# Patient Record
Sex: MALE | Race: WHITE | NOT HISPANIC OR LATINO | Employment: OTHER | URBAN - METROPOLITAN AREA
[De-identification: names, ages, dates, MRNs, and addresses within clinical notes are randomized per-mention and may not be internally consistent; named-entity substitution may affect disease eponyms.]

---

## 2017-03-14 ENCOUNTER — OFFICE VISIT (OUTPATIENT)
Dept: MEDICAL GROUP | Facility: MEDICAL CENTER | Age: 62
End: 2017-03-14
Payer: COMMERCIAL

## 2017-03-14 VITALS
RESPIRATION RATE: 16 BRPM | WEIGHT: 172 LBS | OXYGEN SATURATION: 96 % | DIASTOLIC BLOOD PRESSURE: 80 MMHG | TEMPERATURE: 97.2 F | HEART RATE: 77 BPM | BODY MASS INDEX: 21.39 KG/M2 | SYSTOLIC BLOOD PRESSURE: 128 MMHG | HEIGHT: 75 IN

## 2017-03-14 DIAGNOSIS — R73.01 ELEVATED FASTING BLOOD SUGAR: ICD-10-CM

## 2017-03-14 DIAGNOSIS — F41.9 ANXIETY: ICD-10-CM

## 2017-03-14 DIAGNOSIS — J30.1 SEASONAL ALLERGIC RHINITIS DUE TO POLLEN: ICD-10-CM

## 2017-03-14 DIAGNOSIS — Z23 NEED FOR TDAP VACCINATION: ICD-10-CM

## 2017-03-14 DIAGNOSIS — Z00.00 ANNUAL PHYSICAL EXAM: ICD-10-CM

## 2017-03-14 DIAGNOSIS — R94.31 QT PROLONGATION: ICD-10-CM

## 2017-03-14 PROCEDURE — 99396 PREV VISIT EST AGE 40-64: CPT | Mod: 25 | Performed by: FAMILY MEDICINE

## 2017-03-14 PROCEDURE — 90715 TDAP VACCINE 7 YRS/> IM: CPT | Performed by: FAMILY MEDICINE

## 2017-03-14 PROCEDURE — 90471 IMMUNIZATION ADMIN: CPT | Performed by: FAMILY MEDICINE

## 2017-03-14 RX ORDER — FLUTICASONE PROPIONATE 50 MCG
2 SPRAY, SUSPENSION (ML) NASAL DAILY
Qty: 16 G | Refills: 3 | Status: ON HOLD | OUTPATIENT
Start: 2017-03-14 | End: 2017-04-13

## 2017-03-14 RX ORDER — ALPRAZOLAM 0.5 MG/1
0.5 TABLET ORAL 2 TIMES DAILY PRN
Qty: 180 TAB | Refills: 1 | Status: SHIPPED
Start: 2017-03-14 | End: 2017-08-22 | Stop reason: SDUPTHER

## 2017-03-14 ASSESSMENT — PATIENT HEALTH QUESTIONNAIRE - PHQ9: CLINICAL INTERPRETATION OF PHQ2 SCORE: 0

## 2017-03-14 NOTE — ASSESSMENT & PLAN NOTE
Patient has long history of anxiety. He states that he was diagnosed with anxiety early in his life around the age of 17 but probably had it before then. For a long time he has been on Xanax 0.5 mg twice daily which has helped to control his anxiety well. He has been tried on Paxil, Zoloft, Wellbutrin, other medication that he cannot remember, all of which had the opposite effect of what they're intended to, they caused more anxiety.  Patient is requesting refill on Xanax.

## 2017-03-14 NOTE — PROGRESS NOTES
"Subjective:   Efraín Ayala is a 62 y.o. male here today for annual    Seasonal allergies  Patient is complaining of increased allergies recently. He still been using Zyrtec nightly, which usually helps.    Anxiety  Patient has long history of anxiety. He states that he was diagnosed with anxiety early in his life around the age of 17 but probably had it before then. For a long time he has been on Xanax 0.5 mg twice daily which has helped to control his anxiety well. He has been tried on Paxil, Zoloft, Wellbutrin, other medication that he cannot remember, all of which had the opposite effect of what they're intended to, they caused more anxiety.  Patient is requesting refill on Xanax.    Elevated fasting blood sugar  A1c was 5.6 on last labs one year ago.         Current medicines (including changes today)  Current Outpatient Prescriptions   Medication Sig Dispense Refill   • fluticasone (FLONASE) 50 MCG/ACT nasal spray Spray 2 Sprays in nose every day. 16 g 3   • alprazolam (XANAX) 0.5 MG Tab Take 1 Tab by mouth 2 times a day as needed. FOR ANXIETY 180 Tab 1     No current facility-administered medications for this visit.     He  has a past medical history of Seasonal allergies (3/11/2015) and Anxiety (3/11/2015).    ROS   No chest pain, no shortness of breath, no abdominal pain       Objective:     Blood pressure 128/80, pulse 77, temperature 36.2 °C (97.2 °F), resp. rate 16, height 1.905 m (6' 3\"), weight 78.019 kg (172 lb), SpO2 96 %. Body mass index is 21.5 kg/(m^2).   Physical Exam:  Constitutional: Alert, no distress.  Skin: Warm, dry, good turgor, no rashes in visible areas.  Eye: Equal, round and reactive, conjunctiva clear, lids normal.  ENMT: Lips without lesions, good dentition, oropharynx clear.  Neck: Trachea midline, no masses, no thyromegaly. No cervical or supraclavicular lymphadenopathy  Respiratory: Unlabored respiratory effort, lungs clear to auscultation, no wheezes, no " shant.  Cardiovascular: Normal S1, S2, no murmur, no edema.  Rectal: No external lesions, normal sphincter tone, prostate moderately enlarged with no nodularity.  Psych: Alert and oriented x3, normal affect and mood.        Assessment and Plan:   The following treatment plan was discussed    1. Annual physical exam  Check labs and call with results. Discussed healthy lifestyle.  - CBC WITH DIFFERENTIAL; Future  - COMP METABOLIC PANEL; Future  - HEMOGLOBIN A1C; Future  - LIPID PROFILE; Future  - TSH WITH REFLEX TO FT4; Future  - VITAMIN D,25 HYDROXY; Future  - PROSTATE SPECIFIC AG SCREENING; Future    2. Seasonal allergic rhinitis due to pollen  Advised patient to use Zyrtec nightly. Prescription for Flonase to use as needed.  - fluticasone (FLONASE) 50 MCG/ACT nasal spray; Spray 2 Sprays in nose every day.  Dispense: 16 g; Refill: 3    3. Anxiety  Stable. Refill alprazolam. Advised patient to avoid alcohol intake.  - alprazolam (XANAX) 0.5 MG Tab; Take 1 Tab by mouth 2 times a day as needed. FOR ANXIETY  Dispense: 180 Tab; Refill: 1    4. Elevated fasting blood sugar  Check labs and call with results.    5. QT prolongation  Asymptomatic. Continue to monitor.    6. Need for Tdap vaccination  - TDAP VACCINE =>6YO IM      Followup: Return in about 1 year (around 3/14/2018) for Welcome to [employer], Long.

## 2017-03-14 NOTE — MR AVS SNAPSHOT
"        Efraín Ayala   3/14/2017 10:20 AM   Office Visit   MRN: [id number]    Department:  [facility]   Dept Phone:  [phone]    Description:  Male : 1955   Provider:  Binu Montemayor M.D.           Reason for Visit     Annual Exam           Allergies as of 3/14/2017     Allergen Noted Reactions    Shellfish Allergy 2015         You were diagnosed with     Annual physical exam   [906240]       Seasonal allergic rhinitis due to pollen   [3672072]       Anxiety   [495886]       Elevated fasting blood sugar   [113903]       QT prolongation   [639407]       Need for Tdap vaccination   [242429]         Vital Signs     Blood Pressure Pulse Temperature Respirations Height Weight    128/80 mmHg 77 36.2 °C (97.2 °F) 16 1.905 m (6' 3\") 78.019 kg (172 lb)    Body Mass Index Oxygen Saturation Smoking Status             21.50 kg/m2 96% Former Smoker         Basic Information     Date Of Birth Sex Race Ethnicity Preferred Language    1955 Male White Non- English      Problem List              ICD-10-CM Priority Class Noted - Resolved    Seasonal allergies J30.2   3/11/2015 - Present    Anxiety F41.9   3/11/2015 - Present    Elevated fasting blood sugar R73.01   3/11/2015 - Present    QT prolongation R94.31   2015 - Present      Health Maintenance        Date Due Completion Dates    IMM DTaP/Tdap/Td Vaccine (1 - Tdap) 1972 ---    IMM INFLUENZA (1) 2016    COLONOSCOPY 2020            Current Immunizations     Influenza Vaccine Quad Inj (Pf) 2015    SHINGLES VACCINE 2016    Tdap Vaccine  Incomplete      Below and/or attached are the medications your provider expects you to take. Review all of your home medications and newly ordered medications with your provider and/or pharmacist. Follow medication instructions as directed by your provider and/or pharmacist. Please keep your medication list with you and share with your provider. Update the " information when medications are discontinued, doses are changed, or new medications (including over-the-counter products) are added; and carry medication information at all times in the event of emergency situations     Allergies:  SHELLFISH ALLERGY - (reactions not documented)               Medications  Valid as of: March 14, 2017 - 10:38 AM    Generic Name Brand Name Tablet Size Instructions for use    ALPRAZolam (Tab) XANAX 0.5 MG Take 1 Tab by mouth 2 times a day as needed. FOR ANXIETY        Fluticasone Propionate (Suspension) FLONASE 50 MCG/ACT Spray 2 Sprays in nose every day.        .                 Medicines prescribed today were sent to:     [facility] [address] [place] [address] [place], [place] - [address] AT [facility] OF [employer] [facility]. & [place]    [address] [place] [address]    Phone: [phone] Fax: [phone]    Open 24 Hours?: No      Medication refill instructions:       If your prescription bottle indicates you have medication refills left, it is not necessary to call your provider’s office. Please contact your pharmacy and they will refill your medication.    If your prescription bottle indicates you do not have any refills left, you may request refills at any time through one of the following ways: The online [employer] system (except Urgent Care), by calling your provider’s office, or by asking your pharmacy to contact your provider’s office with a refill request. Medication refills are processed only during regular business hours and may not be available until the next business day. Your provider may request additional information or to have a follow-up visit with you prior to refilling your medication.   *Please Note: Medication refills are assigned a new Rx number when refilled electronically. Your pharmacy may indicate that no refills were authorized even though a new prescription for the same medication is available at the pharmacy. Please request the medicine by name with the pharmacy  before contacting your provider for a refill.        Your To Do List     Future Labs/Procedures Complete By Expires    CBC WITH DIFFERENTIAL  As directed 3/15/2018    COMP METABOLIC PANEL  As directed 3/15/2018    HEMOGLOBIN A1C  As directed 3/15/2018    LIPID PROFILE  As directed 3/15/2018    PROSTATE SPECIFIC AG SCREENING  As directed 3/15/2018    TSH WITH REFLEX TO FT4  As directed 3/14/2018    VITAMIN D,25 HYDROXY  As directed 3/15/2018         MyChart Access Code: Activation code not generated  Current MyChart Status: Active

## 2017-03-14 NOTE — ASSESSMENT & PLAN NOTE
Patient is complaining of increased allergies recently. He still been using Zyrtec nightly, which usually helps.

## 2017-03-30 ENCOUNTER — OFFICE VISIT (OUTPATIENT)
Dept: URGENT CARE | Facility: PHYSICIAN GROUP | Age: 62
End: 2017-03-30
Payer: COMMERCIAL

## 2017-03-30 ENCOUNTER — PATIENT MESSAGE (OUTPATIENT)
Dept: MEDICAL GROUP | Facility: MEDICAL CENTER | Age: 62
End: 2017-03-30

## 2017-03-30 ENCOUNTER — HOSPITAL ENCOUNTER (EMERGENCY)
Facility: MEDICAL CENTER | Age: 62
End: 2017-03-30
Attending: EMERGENCY MEDICINE
Payer: COMMERCIAL

## 2017-03-30 ENCOUNTER — HOSPITAL ENCOUNTER (OUTPATIENT)
Dept: RADIOLOGY | Facility: MEDICAL CENTER | Age: 62
End: 2017-03-30
Attending: PHYSICIAN ASSISTANT
Payer: COMMERCIAL

## 2017-03-30 VITALS
DIASTOLIC BLOOD PRESSURE: 76 MMHG | OXYGEN SATURATION: 96 % | WEIGHT: 168 LBS | SYSTOLIC BLOOD PRESSURE: 132 MMHG | TEMPERATURE: 98.8 F | BODY MASS INDEX: 21 KG/M2 | HEART RATE: 77 BPM | RESPIRATION RATE: 14 BRPM

## 2017-03-30 VITALS
HEIGHT: 76 IN | WEIGHT: 175.27 LBS | SYSTOLIC BLOOD PRESSURE: 155 MMHG | BODY MASS INDEX: 21.34 KG/M2 | HEART RATE: 65 BPM | TEMPERATURE: 97.9 F | DIASTOLIC BLOOD PRESSURE: 93 MMHG | RESPIRATION RATE: 16 BRPM

## 2017-03-30 DIAGNOSIS — K40.90 RIGHT INGUINAL HERNIA: ICD-10-CM

## 2017-03-30 DIAGNOSIS — R10.31 RIGHT INGUINAL PAIN: ICD-10-CM

## 2017-03-30 DIAGNOSIS — K40.30 INCARCERATED RIGHT INGUINAL HERNIA: Primary | ICD-10-CM

## 2017-03-30 LAB
APPEARANCE UR: CLEAR
BILIRUB UR STRIP-MCNC: NORMAL MG/DL
COLOR UR AUTO: NORMAL
GLUCOSE UR STRIP.AUTO-MCNC: NORMAL MG/DL
KETONES UR STRIP.AUTO-MCNC: NORMAL MG/DL
LEUKOCYTE ESTERASE UR QL STRIP.AUTO: NORMAL
NITRITE UR QL STRIP.AUTO: NORMAL
PH UR STRIP.AUTO: 6 [PH] (ref 5–8)
PROT UR QL STRIP: NORMAL MG/DL
RBC UR QL AUTO: NORMAL
SP GR UR STRIP.AUTO: 1
UROBILINOGEN UR STRIP-MCNC: NORMAL MG/DL

## 2017-03-30 PROCEDURE — 76857 US EXAM PELVIC LIMITED: CPT

## 2017-03-30 PROCEDURE — 81002 URINALYSIS NONAUTO W/O SCOPE: CPT | Performed by: PHYSICIAN ASSISTANT

## 2017-03-30 PROCEDURE — 99215 OFFICE O/P EST HI 40 MIN: CPT | Performed by: PHYSICIAN ASSISTANT

## 2017-03-30 PROCEDURE — 99283 EMERGENCY DEPT VISIT LOW MDM: CPT

## 2017-03-30 RX ORDER — OXYCODONE HYDROCHLORIDE AND ACETAMINOPHEN 5; 325 MG/1; MG/1
1-2 TABLET ORAL EVERY 4 HOURS PRN
Qty: 15 TAB | Refills: 0 | Status: SHIPPED | OUTPATIENT
Start: 2017-03-30 | End: 2017-08-22

## 2017-03-30 RX ORDER — IBUPROFEN 200 MG
400 TABLET ORAL EVERY 6 HOURS PRN
COMMUNITY

## 2017-03-30 RX ORDER — HYDROCODONE BITARTRATE AND ACETAMINOPHEN 5; 325 MG/1; MG/1
1 TABLET ORAL
Status: ON HOLD | COMMUNITY
End: 2017-04-13

## 2017-03-30 ASSESSMENT — ENCOUNTER SYMPTOMS
NAUSEA: 1
CONSTIPATION: 0
VOMITING: 0
ABDOMINAL PAIN: 1
FEVER: 0
DIARRHEA: 0
CHILLS: 0

## 2017-03-30 ASSESSMENT — PAIN SCALES - GENERAL: PAINLEVEL_OUTOF10: 7

## 2017-03-30 NOTE — MR AVS SNAPSHOT
Efraín Ayala   3/30/2017 10:30 AM   Office Visit   MRN: [id number]    Department:  [facility]   Dept Phone:  [phone]    Description:  Male : 1955   Provider:  Jael Spence PA-C           Reason for Visit     Groin Pain R side groin pain x1 wk      Allergies as of 3/30/2017     Allergen Noted Reactions    Shellfish Allergy 2015         You were diagnosed with     Right inguinal pain   [309598]         Vital Signs     Blood Pressure Pulse Temperature Respirations Weight Oxygen Saturation    132/76 mmHg 77 37.1 °C (98.8 °F) 14 76.204 kg (168 lb) 96%    Smoking Status                   Former Smoker           Basic Information     Date Of Birth Sex Race Ethnicity Preferred Language    1955 Male White Non- English      Your appointments     Mar 30, 2017  2:30 PM   [facility] [place] [facility]    [address] [place] [address]  [facility] [address] [phone]              Problem List              ICD-10-CM Priority Class Noted - Resolved    Seasonal allergies J30.2   3/11/2015 - Present    Anxiety F41.9   3/11/2015 - Present    Elevated fasting blood sugar R73.01   3/11/2015 - Present    QT prolongation R94.31   2015 - Present      Health Maintenance        Date Due Completion Dates    IMM INFLUENZA (1) 2016    COLONOSCOPY 2020    IMM DTaP/Tdap/Td Vaccine (2 - Td) 3/14/2027 3/14/2017            Results     POCT Urinalysis      Component Value Standard Range & Units    POC Color light yellow Negative    POC Appearance clear Negative    POC Leukocyte Esterase neg Negative    POC Nitrites neg Negative    POC Urobiligen neg Negative (0.2) mg/dL    POC Protein neg Negative mg/dL    POC Urine PH 6.0 5.0 - 8.0    POC Blood neg Negative    POC Specific Gravity 1.005 <1.005 - >1.030    POC Ketones neg Negative mg/dL    POC Biliruben neg Negative mg/dL    POC Glucose neg Negative mg/dL                        Current  Immunizations     Influenza Vaccine Quad Inj (Pf) 9/21/2015    SHINGLES VACCINE 2/17/2016    Tdap Vaccine 3/14/2017 10:38 AM      Below and/or attached are the medications your provider expects you to take. Review all of your home medications and newly ordered medications with your provider and/or pharmacist. Follow medication instructions as directed by your provider and/or pharmacist. Please keep your medication list with you and share with your provider. Update the information when medications are discontinued, doses are changed, or new medications (including over-the-counter products) are added; and carry medication information at all times in the event of emergency situations     Allergies:  SHELLFISH ALLERGY - (reactions not documented)               Medications  Valid as of: March 30, 2017 - 12:05 PM    Generic Name Brand Name Tablet Size Instructions for use    ALPRAZolam (Tab) XANAX 0.5 MG Take 1 Tab by mouth 2 times a day as needed. FOR ANXIETY        Fluticasone Propionate (Suspension) FLONASE 50 MCG/ACT Spray 2 Sprays in nose every day.        .                 Medicines prescribed today were sent to:     [facility] [address] [place] [address] [facility] [address] AT [facility] [employer] [facility]. & [place]    [address] [place] [address]    Phone: [phone] Fax: [phone]    Open 24 Hours?: No      Medication refill instructions:       If your prescription bottle indicates you have medication refills left, it is not necessary to call your provider’s office. Please contact your pharmacy and they will refill your medication.    If your prescription bottle indicates you do not have any refills left, you may request refills at any time through one of the following ways: The online [employer] system (except Urgent Care), by calling your provider’s office, or by asking your pharmacy to contact your provider’s office with a refill request. Medication refills are processed only during regular business  hours and may not be available until the next business day. Your provider may request additional information or to have a follow-up visit with you prior to refilling your medication.   *Please Note: Medication refills are assigned a new Rx number when refilled electronically. Your pharmacy may indicate that no refills were authorized even though a new prescription for the same medication is available at the pharmacy. Please request the medicine by name with the pharmacy before contacting your provider for a refill.        Your To Do List     Future Labs/Procedures Complete By Expires    US-INGUINAL HERNIA  As directed 3/30/2018         [employer] Access Code: Activation code not generated  Current [employer] Status: Active

## 2017-03-30 NOTE — ED AVS SNAPSHOT
Home Care Instructions                                                                                                                Efraín Ayala   MRN: [id number]    Department:  [facility], Emergency Dept   Date of Visit:  3/30/2017            [facility], Emergency Dept    [address] [place] [address]    Phone:  [phone]      You were seen by     Jose Cross M.D.      Your Diagnosis Was     Right inguinal hernia     K40.90       Follow-up Information     1. Schedule an appointment as soon as possible for a visit with Kwadwo Brewer M.D..    Specialty:  Surgery    Contact information    [address] [facility]  [address]  [place] [address] [phone]          2. Follow up with [facility], Emergency Dept.    Specialty:  Emergency Medicine    Why:  If symptoms worsen    Contact information    [address] [place] [address] [phone]      Medication Information     Review all of your home medications and newly ordered medications with your primary doctor and/or pharmacist as soon as possible. Follow medication instructions as directed by your doctor and/or pharmacist.     Please keep your complete medication list with you and share with your physician. Update the information when medications are discontinued, doses are changed, or new medications (including over-the-counter products) are added; and carry medication information at all times in the event of emergency situations.               Medication List      START taking these medications        Instructions    Morning Afternoon Evening Bedtime    oxycodone-acetaminophen 5-325 MG Tabs   Commonly known as:  PERCOCET        Take 1-2 Tabs by mouth every four hours as needed.   Dose:  1-2 Tab                          ASK your doctor about these medications        Instructions    Morning Afternoon Evening Bedtime    alprazolam 0.5 MG Tabs   Commonly known  as:  XANAX        Take 1 Tab by mouth 2 times a day as needed. FOR ANXIETY   Dose:  0.5 mg                        fluticasone 50 MCG/ACT nasal spray   Commonly known as:  FLONASE        Spray 2 Sprays in nose every day.   Dose:  2 Spray                        hydrocodone-acetaminophen 5-325 MG Tabs per tablet   Commonly known as:  NORCO        Take 1 Tab by mouth Once.   Dose:  1 Tab                        ibuprofen 200 MG Tabs   Commonly known as:  MOTRIN        Take 200 mg by mouth every 6 hours as needed.   Dose:  200 mg                             Where to Get Your Medications      You can get these medications from any pharmacy     Bring a paper prescription for each of these medications    - oxycodone-acetaminophen 5-325 MG Tabs              Discharge Instructions       Inguinal Hernia, Adult  Muscles help keep everything in the body in its proper place. But if a weak spot in the muscles develops, something can poke through. That is called a hernia. When this happens in the lower part of the belly (abdomen), it is called an inguinal hernia. (It takes its name from a part of the body in this region called the inguinal canal.) A weak spot in the wall of muscles lets some fat or part of the small intestine bulge through. An inguinal hernia can develop at any age. Men get them more often than women.  CAUSES   In adults, an inguinal hernia develops over time.  · It can be triggered by:  ¨ Suddenly straining the muscles of the lower abdomen.  ¨ Lifting heavy objects.  ¨ Straining to have a bowel movement. Difficult bowel movements (constipation) can lead to this.  ¨ Constant coughing. This may be caused by smoking or lung disease.  ¨ Being overweight.  ¨ Being pregnant.  ¨ Working at a job that requires long periods of standing or heavy lifting.  ¨ Having had an inguinal hernia before.  One type can be an emergency situation. It is called a strangulated inguinal hernia. It develops if part of the small intestine  slips through the weak spot and cannot get back into the abdomen. The blood supply can be cut off. If that happens, part of the intestine may die. This situation requires emergency surgery.  SYMPTOMS   Often, a small inguinal hernia has no symptoms. It is found when a healthcare provider does a physical exam. Larger hernias usually have symptoms.   · In adults, symptoms may include:  ¨ A lump in the groin. This is easier to see when the person is standing. It might disappear when lying down.  ¨ In men, a lump in the scrotum.  ¨ Pain or burning in the groin. This occurs especially when lifting, straining or coughing.  ¨ A dull ache or feeling of pressure in the groin.  · Signs of a strangulated hernia can include:  ¨ A bulge in the groin that becomes very painful and tender to the touch.  ¨ A bulge that turns red or purple.  ¨ Fever, nausea and vomiting.  ¨ Inability to have a bowel movement or to pass gas.  DIAGNOSIS   To decide if you have an inguinal hernia, a healthcare provider will probably do a physical examination.  · This will include asking questions about any symptoms you have noticed.  · The healthcare provider might feel the groin area and ask you to cough. If an inguinal hernia is felt, the healthcare provider may try to slide it back into the abdomen.  · Usually no other tests are needed.  TREATMENT   Treatments can vary. The size of the hernia makes a difference. Options include:  · Watchful waiting. This is often suggested if the hernia is small and you have had no symptoms.  ¨ No medical procedure will be done unless symptoms develop.  ¨ You will need to watch closely for symptoms. If any occur, contact your healthcare provider right away.  · Surgery. This is used if the hernia is larger or you have symptoms.  ¨ Open surgery. This is usually an outpatient procedure (you will not stay overnight in a hospital). An cut (incision) is made through the skin in the groin. The hernia is put back inside the  "abdomen. The weak area in the muscles is then repaired by herniorrhaphy or hernioplasty. Herniorrhaphy: in this type of surgery, the weak muscles are sewn back together. Hernioplasty: a patch or mesh is used to close the weak area in the abdominal wall.  ¨ Laparoscopy. In this procedure, a surgeon makes small incisions. A thin tube with a tiny video camera (called a laparoscope) is put into the abdomen. The surgeon repairs the hernia with mesh by looking with the video camera and using two long instruments.  HOME CARE INSTRUCTIONS   · After surgery to repair an inguinal hernia:  ¨ You will need to take pain medicine prescribed by your healthcare provider. Follow all directions carefully.  ¨ You will need to take care of the wound from the incision.  ¨ Your activity will be restricted for awhile. This will probably include no heavy lifting for several weeks. You also should not do anything too active for a few weeks. When you can return to work will depend on the type of job that you have.  · During \"watchful waiting\" periods, you should:  ¨ Maintain a healthy weight.  ¨ Eat a diet high in fiber (fruits, vegetables and whole grains).  ¨ Drink plenty of fluids to avoid constipation. This means drinking enough water and other liquids to keep your urine clear or pale yellow.  ¨ Do not lift heavy objects.  ¨ Do not stand for long periods of time.  ¨ Quit smoking. This should keep you from developing a frequent cough.  SEEK MEDICAL CARE IF:   · A bulge develops in your groin area.  · You feel pain, a burning sensation or pressure in the groin. This might be worse if you are lifting or straining.  · You develop a fever of more than 100.5° F (38.1° C).  SEEK IMMEDIATE MEDICAL CARE IF:   · Pain in the groin increases suddenly.  · A bulge in the groin gets bigger suddenly and does not go down.  · For men, there is sudden pain in the scrotum. Or, the size of the scrotum increases.  · A bulge in the groin area becomes red or " purple and is painful to touch.  · You have nausea or vomiting that does not go away.  · You feel your heart beating much faster than normal.  · You cannot have a bowel movement or pass gas.  · You develop a fever of more than 102.0° F (38.9° C).     This information is not intended to replace advice given to you by your health care provider. Make sure you discuss any questions you have with your health care provider.     Document Released: 05/06/2010 Document Revised: 03/11/2013 Document Reviewed: 05/06/2010  [employer] Interactive Patient Education ©2016 [employer]            Patient Information     Patient Information    Following emergency treatment: all patient requiring follow-up care must return either to a private physician or a clinic if your condition worsens before you are able to obtain further medical attention, please return to the emergency room.     Billing Information    At [facility], we work to make the billing process streamlined for our patients.  Our Representatives are here to answer any questions you may have regarding your hospital bill.  If you have insurance coverage and have supplied your insurance information to us, we will submit a claim to your insurer on your behalf.  Should you have any questions regarding your bill, we can be reached online or by phone as follows:  Online: You are able pay your bills online or live chat with our representatives about any billing questions you may have. We are here to help Monday - Friday from 8:00am to 7:30pm and 9:00am - 12:00pm on Saturdays.  Please visit [email or web address]  for more information.   Phone:  [phone] or [phone]    Please note that your emergency physician, surgeon, pathologist, radiologist, anesthesiologist, and other specialists are not employed by [facility] and will therefore bill separately for their services.  Please contact them directly for any questions concerning their bills at  the numbers below:     Emergency Physician Services:  [phone]  [facility]:  [phone]  [facility]:  [phone]  [facility]:  [phone]    1. Your final bill may vary from the amount quoted upon discharge if all procedures are not complete at that time, or if your doctor has additional procedures of which we are not aware. You will receive an additional bill if you return to the Emergency Department at [facility] for suture removal regardless of the facility of which the sutures were placed.     2. Please arrange for settlement of this account at the emergency registration.    3. All self-pay accounts are due in full at the time of treatment.  If you are unable to meet this obligation then payment is expected within 4-5 days.     4. If you have had radiology studies (CT, X-ray, Ultrasound, MRI), you have received a preliminary result during your emergency department visit. Please contact the radiology department [phone] to receive a copy of your final result. Please discuss the Final result with your primary physician or with the follow up physician provided.     Crisis Hotline:  [place] Crisis Hotline:  4-755-CEERMNJ or 1-516.227.6355  [place] Crisis Hotline:    1-201.324.7086 or 550-533-6311         ED Discharge Follow Up Questions    1. In order to provide you with very good care, we would like to follow up with a phone call in the next few days.  May we have your permission to contact you?     YES /  NO    2. What is the best phone number to call you? (       )_____-__________    3. What is the best time to call you?      Morning  /  Afternoon  /  Evening                   Patient Signature:  ____________________________________________________________    Date:  ____________________________________________________________

## 2017-03-30 NOTE — ED AVS SNAPSHOT
Digifeye Access Code: Activation code not generated  Current Digifeye Status: Active    [email or web address]  A secure, online tool to manage your health information     [employer]’s Digifeye® is a secure, online tool that connects you to your personalized health information from the privacy of your home -- day or night - making it very easy for you to manage your healthcare. Once the activation process is completed, you can even access your medical information using the Digifeye kenny, which is available for free in the Apple Kenny store or Google Play store.     Digifeye provides the following levels of access (as shown below):   My Chart Features   [facility] Primary Care Doctor [facility]  Specialists [facility]  Urgent  Care Non-[facility]  Primary Care  Doctor   Email your healthcare team securely and privately 24/7 X X X X   Manage appointments: schedule your next appointment; view details of past/upcoming appointments X      Request prescription refills. X      View recent personal medical records, including lab and immunizations X X X X   View health record, including health history, allergies, medications X X X X   Read reports about your outpatient visits, procedures, consult and ER notes X X X X   See your discharge summary, which is a recap of your hospital and/or ER visit that includes your diagnosis, lab results, and care plan. X X       How to register for Digifeye:  1. Go to  [email or web address].  2. Click on the Sign Up Now box, which takes you to the New Member Sign Up page. You will need to provide the following information:  a. Enter your Digifeye Access Code exactly as it appears at the top of this page. (You will not need to use this code after you’ve completed the sign-up process. If you do not sign up before the expiration date, you must request a new code.)   b. Enter your date of birth.   c. Enter your home email address.   d. Click Submit, and follow the next screen’s instructions.  3. Create a Digifeye ID. This will  be your [employer] login ID and cannot be changed, so think of one that is secure and easy to remember.  4. Create a [employer] password. You can change your password at any time.  5. Enter your Password Reset Question and Answer. This can be used at a later time if you forget your password.   6. Enter your e-mail address. This allows you to receive e-mail notifications when new information is available in [employer].  7. Click Sign Up. You can now view your health information.    For assistance activating your [employer] account, call [phone]

## 2017-03-30 NOTE — ED AVS SNAPSHOT
3/30/2017          Efraín Ayala  [address] [place] [address]    Dear Efraín:    [facility] wants to ensure your discharge home is safe and you or your loved ones have had all your questions answered regarding your care after you leave the hospital.    You may receive a telephone call within two days of your discharge.  This call is to make certain you understand your discharge instructions as well as ensure we provided you with the best care possible during your stay with us.     The call will only last approximately 3-5 minutes and will be done by a nurse.    Once again, we want to ensure your discharge home is safe and that you have a clear understanding of any next steps in your care.  If you have any questions or concerns, please do not hesitate to contact us, we are here for you.  Thank you for choosing [facility] for your healthcare needs.    Sincerely,    Torsten Love    [facility]

## 2017-03-30 NOTE — PROGRESS NOTES
Subjective:      Efraín Ayala is a 62 y.o. male who presents with Groin Pain    PMH:  has a past medical history of Seasonal allergies (3/11/2015) and Anxiety (3/11/2015).  MEDS:   Current outpatient prescriptions:   •  alprazolam (XANAX) 0.5 MG Tab, Take 1 Tab by mouth 2 times a day as needed. FOR ANXIETY, Disp: 180 Tab, Rfl: 1  •  fluticasone (FLONASE) 50 MCG/ACT nasal spray, Spray 2 Sprays in nose every day., Disp: 16 g, Rfl: 3  ALLERGIES:   Allergies   Allergen Reactions   • Shellfish Allergy      SURGHX:   Past Surgical History   Procedure Laterality Date   • Tibia orif  1992     Left     SOCHX:  reports that he has quit smoking. His smoking use included Cigarettes. He has a 7.5 pack-year smoking history. He has never used smokeless tobacco. He reports that he drinks about 10.5 oz of alcohol per week. He reports that he does not use illicit drugs.  FH: family history is not on file. Reviewed with patient/family. Not pertinent to this complaint.            HPI Comments: Patient presents with:  Groin Pain: R side groin pain x1 wk after picking up heavy box. PT states pain has gotten progressively worse over the course of the past 24 hours.  PT states initially it was his right low back that hurt more than his groin, but in the past 24 hours, his back pain is not his main concern.  PT denies N/V/D, but states he has had a poor appetite today.  PT has had a BM today and is passing flatus.  PT denies hx of hernia in the past.  Pt denies testicular pain.         Groin Pain  This is a new problem. The current episode started in the past 7 days. The problem occurs constantly. The problem has been gradually worsening. Associated symptoms include abdominal pain and nausea. Pertinent negatives include no chills, fever or vomiting. The symptoms are aggravated by bending, exertion, standing, walking and twisting. Treatments tried: vicodin at 3am. The treatment provided mild relief.       Review of Systems    Constitutional: Negative for fever and chills.   Gastrointestinal: Positive for nausea and abdominal pain. Negative for vomiting, diarrhea and constipation.   Genitourinary:        Right inguinal pain, denies testicular pain.    All other systems reviewed and are negative.         Objective:     /76 mmHg  Pulse 77  Temp(Src) 37.1 °C (98.8 °F)  Resp 14  Wt 76.204 kg (168 lb)  SpO2 96%     Physical Exam   Constitutional: He is oriented to person, place, and time. He appears well-developed and well-nourished. No distress.   HENT:   Head: Normocephalic.   Mouth/Throat: Oropharynx is clear and moist.   Eyes: Conjunctivae and EOM are normal. Pupils are equal, round, and reactive to light.   Neck: Normal range of motion. Neck supple.   Cardiovascular: Normal rate, regular rhythm and normal heart sounds.    Pulmonary/Chest: Effort normal and breath sounds normal.   Abdominal: Soft. There is tenderness. There is no rigidity, no rebound and no guarding. A hernia is present. Hernia confirmed positive in the right inguinal area.       Genitourinary:   Declined testicular exam at this time.   Musculoskeletal: Normal range of motion.   Neurological: He is alert and oriented to person, place, and time.   Skin: Skin is warm and dry.   Psychiatric: He has a normal mood and affect.   Nursing note and vitals reviewed.         US:     HISTORY/REASON FOR EXAM:  Right inguinal pain with heavy lifting      TECHNIQUE/EXAM DESCRIPTION AND NUMBER OF VIEWS:  Sonographic images of the right groin.    COMPARISON: None    FINDINGS:  There is a small right inguinal hernia, nonreducible per the sonographer. The hernia contains bowel and fat.         Impression        Small nonreducible right direct inguinal hernia.         Reading Provider Reading Date     Antonieta Burden M.D. Mar 30, 2017         Signing Provider Signing Date Signing Time     Antonieta Burden M.D. Mar 30, 2017  3:17 PM     UA: neg dip     Assessment/Plan:     1.  Incarcerated right inguinal hernia     2. Right inguinal pain  US-INGUINAL HERNIA    POCT Urinalysis     Discussed dx with patient and due to the non reducible nature of the hernia in combination with the worsening pain and anorexia today, I feel the patient needs to be evaluated in the ER for possible surgical intervention today.  PT verbalized understanding of this conversation.  PT was instructed to remain NPO from now forward unless instructed otherwise by ER staff.      PT requires evaluation and treatment at a facility that can provide a higher level of care due to acuity of illness/complaint. PT will co to ER by POV.

## 2017-03-31 ENCOUNTER — PATIENT MESSAGE (OUTPATIENT)
Dept: MEDICAL GROUP | Facility: MEDICAL CENTER | Age: 62
End: 2017-03-31

## 2017-03-31 NOTE — ED NOTES
Iv started lfa, pt with 7/10 pain. States last meal 1300, last po fluid 1600, 1 beer. Aware of npo status-await erp

## 2017-03-31 NOTE — ED NOTES
Assisted with patient care. Discharge instructions provided.  Pt verbalized the understanding of discharge instructions to follow up with PCP and to return to ER if condition worsens.  Pt ambulated out of ER without difficulty.   Patient educated not to combine driving or alcohol with percocet.

## 2017-03-31 NOTE — TELEPHONE ENCOUNTER
From: Manuel Ayala  To: Binu Montemayor M.D.  Sent: 3/31/2017 9:34 AM PDT  Subject: Test Result Question    Labs were done at [facility] and Manuel Ott.   ----- Message -----  From: Binu Montemayor M.D.  Sent: 3/31/2017 7:20 AM PDT  To: Manuel Ayala  Subject: RE: Test Result Question    Did you have the labs done at [facility]?    Dr. Montemayor      ----- Message -----   From: MANUEL AYALA   Sent: 3/30/2017 5:34 PM PDT   To: Binu Montemayor M.D.  Subject: Test Result Question    Hi  We never got Vanna lab results. He is being admitted next door for subinguinal hernia repair.     Lissa Ayala

## 2017-03-31 NOTE — ED NOTES
Chief Complaint   Patient presents with   • Hernia     Seen at . US showed sm rt inguinal hernia containing bowel and fat.    Last BM this morning, pain 67/10.

## 2017-03-31 NOTE — DISCHARGE INSTRUCTIONS
Inguinal Hernia, Adult  Muscles help keep everything in the body in its proper place. But if a weak spot in the muscles develops, something can poke through. That is called a hernia. When this happens in the lower part of the belly (abdomen), it is called an inguinal hernia. (It takes its name from a part of the body in this region called the inguinal canal.) A weak spot in the wall of muscles lets some fat or part of the small intestine bulge through. An inguinal hernia can develop at any age. Men get them more often than women.  CAUSES   In adults, an inguinal hernia develops over time.  · It can be triggered by:  ¨ Suddenly straining the muscles of the lower abdomen.  ¨ Lifting heavy objects.  ¨ Straining to have a bowel movement. Difficult bowel movements (constipation) can lead to this.  ¨ Constant coughing. This may be caused by smoking or lung disease.  ¨ Being overweight.  ¨ Being pregnant.  ¨ Working at a job that requires long periods of standing or heavy lifting.  ¨ Having had an inguinal hernia before.  One type can be an emergency situation. It is called a strangulated inguinal hernia. It develops if part of the small intestine slips through the weak spot and cannot get back into the abdomen. The blood supply can be cut off. If that happens, part of the intestine may die. This situation requires emergency surgery.  SYMPTOMS   Often, a small inguinal hernia has no symptoms. It is found when a healthcare provider does a physical exam. Larger hernias usually have symptoms.   · In adults, symptoms may include:  ¨ A lump in the groin. This is easier to see when the person is standing. It might disappear when lying down.  ¨ In men, a lump in the scrotum.  ¨ Pain or burning in the groin. This occurs especially when lifting, straining or coughing.  ¨ A dull ache or feeling of pressure in the groin.  · Signs of a strangulated hernia can include:  ¨ A bulge in the groin that becomes very painful and tender to the  touch.  ¨ A bulge that turns red or purple.  ¨ Fever, nausea and vomiting.  ¨ Inability to have a bowel movement or to pass gas.  DIAGNOSIS   To decide if you have an inguinal hernia, a healthcare provider will probably do a physical examination.  · This will include asking questions about any symptoms you have noticed.  · The healthcare provider might feel the groin area and ask you to cough. If an inguinal hernia is felt, the healthcare provider may try to slide it back into the abdomen.  · Usually no other tests are needed.  TREATMENT   Treatments can vary. The size of the hernia makes a difference. Options include:  · Watchful waiting. This is often suggested if the hernia is small and you have had no symptoms.  ¨ No medical procedure will be done unless symptoms develop.  ¨ You will need to watch closely for symptoms. If any occur, contact your healthcare provider right away.  · Surgery. This is used if the hernia is larger or you have symptoms.  ¨ Open surgery. This is usually an outpatient procedure (you will not stay overnight in a hospital). An cut (incision) is made through the skin in the groin. The hernia is put back inside the abdomen. The weak area in the muscles is then repaired by herniorrhaphy or hernioplasty. Herniorrhaphy: in this type of surgery, the weak muscles are sewn back together. Hernioplasty: a patch or mesh is used to close the weak area in the abdominal wall.  ¨ Laparoscopy. In this procedure, a surgeon makes small incisions. A thin tube with a tiny video camera (called a laparoscope) is put into the abdomen. The surgeon repairs the hernia with mesh by looking with the video camera and using two long instruments.  HOME CARE INSTRUCTIONS   · After surgery to repair an inguinal hernia:  ¨ You will need to take pain medicine prescribed by your healthcare provider. Follow all directions carefully.  ¨ You will need to take care of the wound from the incision.  ¨ Your activity will be  "restricted for awhile. This will probably include no heavy lifting for several weeks. You also should not do anything too active for a few weeks. When you can return to work will depend on the type of job that you have.  · During \"watchful waiting\" periods, you should:  ¨ Maintain a healthy weight.  ¨ Eat a diet high in fiber (fruits, vegetables and whole grains).  ¨ Drink plenty of fluids to avoid constipation. This means drinking enough water and other liquids to keep your urine clear or pale yellow.  ¨ Do not lift heavy objects.  ¨ Do not stand for long periods of time.  ¨ Quit smoking. This should keep you from developing a frequent cough.  SEEK MEDICAL CARE IF:   · A bulge develops in your groin area.  · You feel pain, a burning sensation or pressure in the groin. This might be worse if you are lifting or straining.  · You develop a fever of more than 100.5° F (38.1° C).  SEEK IMMEDIATE MEDICAL CARE IF:   · Pain in the groin increases suddenly.  · A bulge in the groin gets bigger suddenly and does not go down.  · For men, there is sudden pain in the scrotum. Or, the size of the scrotum increases.  · A bulge in the groin area becomes red or purple and is painful to touch.  · You have nausea or vomiting that does not go away.  · You feel your heart beating much faster than normal.  · You cannot have a bowel movement or pass gas.  · You develop a fever of more than 102.0° F (38.9° C).     This information is not intended to replace advice given to you by your health care provider. Make sure you discuss any questions you have with your health care provider.     Document Released: 05/06/2010 Document Revised: 03/11/2013 Document Reviewed: 05/06/2010  [employer] Interactive Patient Education ©2016 [employer]    "

## 2017-03-31 NOTE — PATIENT INSTRUCTIONS
Inguinal Hernia, Adult  Muscles help keep everything in the body in its proper place. But if a weak spot in the muscles develops, something can poke through. That is called a hernia. When this happens in the lower part of the belly (abdomen), it is called an inguinal hernia. (It takes its name from a part of the body in this region called the inguinal canal.) A weak spot in the wall of muscles lets some fat or part of the small intestine bulge through. An inguinal hernia can develop at any age. Men get them more often than women.  CAUSES   In adults, an inguinal hernia develops over time.  · It can be triggered by:  ¨ Suddenly straining the muscles of the lower abdomen.  ¨ Lifting heavy objects.  ¨ Straining to have a bowel movement. Difficult bowel movements (constipation) can lead to this.  ¨ Constant coughing. This may be caused by smoking or lung disease.  ¨ Being overweight.  ¨ Being pregnant.  ¨ Working at a job that requires long periods of standing or heavy lifting.  ¨ Having had an inguinal hernia before.  One type can be an emergency situation. It is called a strangulated inguinal hernia. It develops if part of the small intestine slips through the weak spot and cannot get back into the abdomen. The blood supply can be cut off. If that happens, part of the intestine may die. This situation requires emergency surgery.  SYMPTOMS   Often, a small inguinal hernia has no symptoms. It is found when a healthcare provider does a physical exam. Larger hernias usually have symptoms.   · In adults, symptoms may include:  ¨ A lump in the groin. This is easier to see when the person is standing. It might disappear when lying down.  ¨ In men, a lump in the scrotum.  ¨ Pain or burning in the groin. This occurs especially when lifting, straining or coughing.  ¨ A dull ache or feeling of pressure in the groin.  · Signs of a strangulated hernia can include:  ¨ A bulge in the groin that becomes very painful and tender to the  touch.  ¨ A bulge that turns red or purple.  ¨ Fever, nausea and vomiting.  ¨ Inability to have a bowel movement or to pass gas.  DIAGNOSIS   To decide if you have an inguinal hernia, a healthcare provider will probably do a physical examination.  · This will include asking questions about any symptoms you have noticed.  · The healthcare provider might feel the groin area and ask you to cough. If an inguinal hernia is felt, the healthcare provider may try to slide it back into the abdomen.  · Usually no other tests are needed.  TREATMENT   Treatments can vary. The size of the hernia makes a difference. Options include:  · Watchful waiting. This is often suggested if the hernia is small and you have had no symptoms.  ¨ No medical procedure will be done unless symptoms develop.  ¨ You will need to watch closely for symptoms. If any occur, contact your healthcare provider right away.  · Surgery. This is used if the hernia is larger or you have symptoms.  ¨ Open surgery. This is usually an outpatient procedure (you will not stay overnight in a hospital). An cut (incision) is made through the skin in the groin. The hernia is put back inside the abdomen. The weak area in the muscles is then repaired by herniorrhaphy or hernioplasty. Herniorrhaphy: in this type of surgery, the weak muscles are sewn back together. Hernioplasty: a patch or mesh is used to close the weak area in the abdominal wall.  ¨ Laparoscopy. In this procedure, a surgeon makes small incisions. A thin tube with a tiny video camera (called a laparoscope) is put into the abdomen. The surgeon repairs the hernia with mesh by looking with the video camera and using two long instruments.  HOME CARE INSTRUCTIONS   · After surgery to repair an inguinal hernia:  ¨ You will need to take pain medicine prescribed by your healthcare provider. Follow all directions carefully.  ¨ You will need to take care of the wound from the incision.  ¨ Your activity will be  "restricted for awhile. This will probably include no heavy lifting for several weeks. You also should not do anything too active for a few weeks. When you can return to work will depend on the type of job that you have.  · During \"watchful waiting\" periods, you should:  ¨ Maintain a healthy weight.  ¨ Eat a diet high in fiber (fruits, vegetables and whole grains).  ¨ Drink plenty of fluids to avoid constipation. This means drinking enough water and other liquids to keep your urine clear or pale yellow.  ¨ Do not lift heavy objects.  ¨ Do not stand for long periods of time.  ¨ Quit smoking. This should keep you from developing a frequent cough.  SEEK MEDICAL CARE IF:   · A bulge develops in your groin area.  · You feel pain, a burning sensation or pressure in the groin. This might be worse if you are lifting or straining.  · You develop a fever of more than 100.5° F (38.1° C).  SEEK IMMEDIATE MEDICAL CARE IF:   · Pain in the groin increases suddenly.  · A bulge in the groin gets bigger suddenly and does not go down.  · For men, there is sudden pain in the scrotum. Or, the size of the scrotum increases.  · A bulge in the groin area becomes red or purple and is painful to touch.  · You have nausea or vomiting that does not go away.  · You feel your heart beating much faster than normal.  · You cannot have a bowel movement or pass gas.  · You develop a fever of more than 102.0° F (38.9° C).     This information is not intended to replace advice given to you by your health care provider. Make sure you discuss any questions you have with your health care provider.     Document Released: 05/06/2010 Document Revised: 03/11/2013 Document Reviewed: 05/06/2010  [employer] Patient Education ©2016 [employer]    "

## 2017-04-10 ENCOUNTER — APPOINTMENT (OUTPATIENT)
Dept: ADMISSIONS | Facility: MEDICAL CENTER | Age: 62
End: 2017-04-10
Attending: SURGERY
Payer: COMMERCIAL

## 2017-04-13 ENCOUNTER — HOSPITAL ENCOUNTER (OUTPATIENT)
Facility: MEDICAL CENTER | Age: 62
End: 2017-04-13
Attending: SURGERY | Admitting: SURGERY
Payer: COMMERCIAL

## 2017-04-13 VITALS
HEART RATE: 77 BPM | HEIGHT: 76 IN | BODY MASS INDEX: 20.3 KG/M2 | TEMPERATURE: 99.3 F | OXYGEN SATURATION: 97 % | RESPIRATION RATE: 16 BRPM | WEIGHT: 166.67 LBS

## 2017-04-13 PROBLEM — K40.90 RIGHT INGUINAL HERNIA: Status: ACTIVE | Noted: 2017-04-13

## 2017-04-13 PROCEDURE — 160039 HCHG SURGERY MINUTES - EA ADDL 1 MIN LEVEL 3: Performed by: SURGERY

## 2017-04-13 PROCEDURE — 160025 RECOVERY II MINUTES (STATS): Performed by: SURGERY

## 2017-04-13 PROCEDURE — 502240 HCHG MISC OR SUPPLY RC 0272: Performed by: SURGERY

## 2017-04-13 PROCEDURE — 160002 HCHG RECOVERY MINUTES (STAT): Performed by: SURGERY

## 2017-04-13 PROCEDURE — 501838 HCHG SUTURE GENERAL: Performed by: SURGERY

## 2017-04-13 PROCEDURE — 160009 HCHG ANES TIME/MIN: Performed by: SURGERY

## 2017-04-13 PROCEDURE — 700102 HCHG RX REV CODE 250 W/ 637 OVERRIDE(OP)

## 2017-04-13 PROCEDURE — 160048 HCHG OR STATISTICAL LEVEL 1-5: Performed by: SURGERY

## 2017-04-13 PROCEDURE — 700111 HCHG RX REV CODE 636 W/ 250 OVERRIDE (IP)

## 2017-04-13 PROCEDURE — 110382 HCHG SHELL REV 271: Performed by: SURGERY

## 2017-04-13 PROCEDURE — 700101 HCHG RX REV CODE 250

## 2017-04-13 PROCEDURE — 500888 HCHG PACK, MINOR BASIN: Performed by: SURGERY

## 2017-04-13 PROCEDURE — 160036 HCHG PACU - EA ADDL 30 MINS PHASE I: Performed by: SURGERY

## 2017-04-13 PROCEDURE — 500380 HCHG DRAIN, PENROSE 1/4X12: Performed by: SURGERY

## 2017-04-13 PROCEDURE — 160035 HCHG PACU - 1ST 60 MINS PHASE I: Performed by: SURGERY

## 2017-04-13 PROCEDURE — A9270 NON-COVERED ITEM OR SERVICE: HCPCS

## 2017-04-13 PROCEDURE — 160028 HCHG SURGERY MINUTES - 1ST 30 MINS LEVEL 3: Performed by: SURGERY

## 2017-04-13 PROCEDURE — 160047 HCHG PACU  - EA ADDL 30 MINS PHASE II: Performed by: SURGERY

## 2017-04-13 PROCEDURE — C1781 MESH (IMPLANTABLE): HCPCS | Performed by: SURGERY

## 2017-04-13 PROCEDURE — 160046 HCHG PACU - 1ST 60 MINS PHASE II: Performed by: SURGERY

## 2017-04-13 PROCEDURE — A4606 OXYGEN PROBE USED W OXIMETER: HCPCS | Performed by: SURGERY

## 2017-04-13 PROCEDURE — A6402 STERILE GAUZE <= 16 SQ IN: HCPCS | Performed by: SURGERY

## 2017-04-13 PROCEDURE — 110371 HCHG SHELL REV 272: Performed by: SURGERY

## 2017-04-13 DEVICE — MESH PROGRIP RIGHT (1/EA): Type: IMPLANTABLE DEVICE | Status: FUNCTIONAL

## 2017-04-13 RX ORDER — OXYCODONE HCL 5 MG/5 ML
SOLUTION, ORAL ORAL
Status: COMPLETED
Start: 2017-04-13 | End: 2017-04-13

## 2017-04-13 RX ORDER — SODIUM CHLORIDE, SODIUM LACTATE, POTASSIUM CHLORIDE, CALCIUM CHLORIDE 600; 310; 30; 20 MG/100ML; MG/100ML; MG/100ML; MG/100ML
1000 INJECTION, SOLUTION INTRAVENOUS
Status: COMPLETED | OUTPATIENT
Start: 2017-04-13 | End: 2017-04-13

## 2017-04-13 RX ORDER — LIDOCAINE HYDROCHLORIDE 10 MG/ML
INJECTION, SOLUTION INFILTRATION; PERINEURAL
Status: COMPLETED
Start: 2017-04-13 | End: 2017-04-13

## 2017-04-13 RX ORDER — BUPIVACAINE HYDROCHLORIDE AND EPINEPHRINE 5; 5 MG/ML; UG/ML
INJECTION, SOLUTION EPIDURAL; INTRACAUDAL; PERINEURAL
Status: DISCONTINUED | OUTPATIENT
Start: 2017-04-13 | End: 2017-04-13 | Stop reason: HOSPADM

## 2017-04-13 RX ORDER — HYDROCODONE BITARTRATE AND ACETAMINOPHEN 5; 325 MG/1; MG/1
1 TABLET ORAL EVERY 6 HOURS PRN
Qty: 30 TAB | Refills: 0 | Status: SHIPPED | OUTPATIENT
Start: 2017-04-13 | End: 2017-08-22

## 2017-04-13 RX ADMIN — LIDOCAINE HYDROCHLORIDE: 10 INJECTION, SOLUTION INFILTRATION; PERINEURAL at 12:40

## 2017-04-13 RX ADMIN — SODIUM CHLORIDE, SODIUM LACTATE, POTASSIUM CHLORIDE, CALCIUM CHLORIDE 1000 ML: 600; 310; 30; 20 INJECTION, SOLUTION INTRAVENOUS at 12:40

## 2017-04-13 RX ADMIN — OXYCODONE HYDROCHLORIDE 10 MG: 5 SOLUTION ORAL at 14:35

## 2017-04-13 ASSESSMENT — PAIN SCALES - GENERAL
PAINLEVEL_OUTOF10: 2
PAINLEVEL_OUTOF10: 3
PAINLEVEL_OUTOF10: 3
PAINLEVEL_OUTOF10: ASSUMED PAIN PRESENT
PAINLEVEL_OUTOF10: 3
PAINLEVEL_OUTOF10: 8
PAINLEVEL_OUTOF10: 3

## 2017-04-13 NOTE — OP REPORT
Operative Report    Date: 4/13/2017    Surgeon: Kwadwo Brewer M.D.    Assistant: none    Anesthesia: Dr. Sal    Pre-operative Diagnosis: right inguinal hernia, K40.9 Unilateral inguinal hernia, without obstruction or gangrene    Post-operative Diagnosis:  right inguinal hernia    Procedure: repair of  right inguinal hernia with mesh    Findings: direct and small indirect right inguinal hernia    Procedure in detail: The patient was identified in the pre-operative holding area and brought to the operating room. Correct side and site were identified. Pre-operative antibiotics of Ancef were administered prior to the procedure. GETA was smoothly induced. The patient was prepped and draped in the usual sterile fashion.    After infiltration of local anesthetic, an inguinal incision was made down to the Anamaria's fascia. This layer was divided with the bovie and the external oblique fascia was identified. A small stab incision was made and then extended with the scissors to the external inguinal ring. The ilioinguinal nerve was identified but sacrificed. The cord was isolated with a penrose drain and the hernia sac was identified and dissected free of the cord to the internal inguinal ring. No sliding components of bladder or bowel were noted and the sac was suture ligated with 2-0 vicryl suture.     . A direct hernia component was identified, and the contents reduced.     A Progrip mesh was used to create a tension free repair. It was sutured to the pubic tubercle using  0-Ethibond sutures. It was placed on the shelving edge inferiorly and the conjoined tendon superiorly, and it was placed around the cord to recreate the internal ring  and the mesh placed under the external oblique laterally.     The wound was then copiously irrigated with normal saline. The ilioinguinal nerve was placed into the inguinal canal and the external oblique fascia was closed with interrupted 2-0 vicryl sutures. Anamaria's fascia was closed with  interrupted 2-0 vicryl and the skin was closed in a subcuticular fashion using running 4-0 monocryl suture. The wound was cleaned and dried.      The patient was awakened from anesthetic, and was taken to the recovery room in stable condition.    Sponge and needle counts were correct at the end of the case.     EBL: minimal    Dispo: to PACU    Kwadwo Brewer M.D.  [facility]  [phone]

## 2017-04-13 NOTE — IP AVS SNAPSHOT
" Home Care Instructions                                                                                                                Name:Efraín Ayala  Medical Record Number:[id number]  CSN: [id number]    YOB: 1955   Age: 62 y.o.  Sex: male  HT:1.93 m (6' 4\") WT: 75.6 kg (166 lb 10.7 oz)          Admit Date: 4/13/2017     Discharge Date:   Today's Date: 4/13/2017  Attending Doctor:  Kwadwo Brewer M.D.                  Allergies:  Other misc and Shellfish allergy                Discharge Instructions         ACTIVITY: Rest and take it easy for the first 24 hours.  A responsible adult is recommended to remain with you during that time.  It is normal to feel sleepy.  We encourage you to not do anything that requires balance, judgment or coordination.    MILD FLU-LIKE SYMPTOMS ARE NORMAL. YOU MAY EXPERIENCE GENERALIZED MUSCLE ACHES, THROAT IRRITATION, HEADACHE AND/OR SOME NAUSEA.    FOR 24 HOURS DO NOT:  Drive, operate machinery or run household appliances.  Drink beer or alcoholic beverages.   Make important decisions or sign legal documents.    SPECIAL INSTRUCTIONS:   Inguinal Hernia, Adult , Care After  Refer to this sheet in the next few weeks. These discharge instructions provide you with general information on caring for yourself after you leave the hospital. Your caregiver may also give you specific instructions. Your treatment has been planned according to the most current medical practices available, but unavoidable complications sometimes occur. If you have any problems or questions after discharge, please call your caregiver.  HOME CARE INSTRUCTIONS  · Put ice on the operative site.  ¨ Put ice in a plastic bag.  ¨ Place a towel between your skin and the bag.  ¨ Leave the ice on for 15-20 minutes at a time, 03-04 times a day while awake.  · Change bandages (dressings) as directed.  · Keep the wound dry and clean. The wound may be washed gently with soap and water. Gently blot or dab the " wound dry. It is okay to take showers 24 to 48 hours after surgery. Do not take baths, use swimming pools, or use hot tubs for 10 days, or as directed by your caregiver.  · Only take over-the-counter or prescription medicines for pain, discomfort, or fever as directed by your caregiver.  · Continue your normal diet as directed.  · Do not lift anything more than 10 pounds or play contact sports for 3 weeks, or as directed.  SEEK MEDICAL CARE IF:  · There is redness, swelling, or increasing pain in the wound.  · There is fluid (pus) coming from the wound.  · There is drainage from a wound lasting longer than 1 day.  · You have an oral temperature above 102° F (38.9° C).  · You notice a bad smell coming from the wound or dressing.  · The wound breaks open after the stitches (sutures) have been removed.  · You notice increasing pain in the shoulders (shoulder strap areas).  · You develop dizzy episodes or fainting while standing.  · You feel sick to your stomach (nauseous) or throw up (vomit).  SEEK IMMEDIATE MEDICAL CARE IF:  · You develop a rash.  · You have difficulty breathing.  · You develop a reaction or have side effects to medicines you were given.  MAKE SURE YOU:   · Understand these instructions.  · Will watch your condition.  · Will get help right away if you are not doing well or get worse.     This information is not intended to replace advice given to you by your health care provider. Make sure you discuss any questions you have with your health care provider.       DIET: To avoid nausea, slowly advance diet as tolerated, avoiding spicy or greasy foods for the first day.  Add more substantial food to your diet according to your physician's instructions. INCREASE FLUIDS AND FIBER TO AVOID CONSTIPATION.    SURGICAL DRESSING/BATHING: Follow instructions given to you by your surgeon    FOLLOW-UP APPOINTMENT:  A follow-up appointment should be arranged with your doctor; call to schedule.    You should CALL YOUR  PHYSICIAN if you develop:  Fever greater than 101 degrees F.  Pain not relieved by medication, or persistent nausea or vomiting.  Excessive bleeding (blood soaking through dressing) or unexpected drainage from the wound.  Extreme redness or swelling around the incision site, drainage of pus or foul smelling drainage.  Inability to urinate or empty your bladder within 8 hours.  Problems with breathing or chest pain.    You should call 911 if you develop problems with breathing or chest pain.  If you are unable to contact your doctor or surgical center, you should go to the nearest emergency room or urgent care center.  Physician's telephone #: Dr. Brewer [phone]    If any questions arise, call your doctor.  If your doctor is not available, please feel free to call the Surgical Center at [phone].  The Center is open Monday through Friday from 7AM to 7PM.  You can also call the [facility] HOTLINE open 24 hours/day, 7 days/week and speak to a nurse at [phone], or toll free at [phone].    A registered nurse may call you a few days after your surgery to see how you are doing after your procedure.    MEDICATIONS: Resume taking daily medication.  Take prescribed pain medication with food.  If no medication is prescribed, you may take non-aspirin pain medication if needed.  PAIN MEDICATION CAN BE VERY CONSTIPATING.  Take a stool softener or laxative such as senokot, pericolace, or milk of magnesia if needed.    Prescription given for **Norco*.  Last pain medication given at **2:35 pm.    If your physician has prescribed pain medication that includes Acetaminophen (Tylenol), do not take additional Acetaminophen (Tylenol) while taking the prescribed medication.    Depression / Suicide Risk    As you are discharged from this [facility] facility, it is important to learn how to keep safe from harming yourself.    Recognize the warning signs:  · Abrupt changes in personality, positive or negative-  including increase in energy   · Giving away possessions  · Change in eating patterns- significant weight changes-  positive or negative  · Change in sleeping patterns- unable to sleep or sleeping all the time   · Unwillingness or inability to communicate  · Depression  · Unusual sadness, discouragement and loneliness  · Talk of wanting to die  · Neglect of personal appearance   · Rebelliousness- reckless behavior  · Withdrawal from people/activities they love  · Confusion- inability to concentrate     If you or a loved one observes any of these behaviors or has concerns about self-harm, here's what you can do:  · Talk about it- your feelings and reasons for harming yourself  · Remove any means that you might use to hurt yourself (examples: pills, rope, extension cords, firearm)  · Get professional help from the community (Mental Health, Substance Abuse, psychological counseling)  · Do not be alone:Call your Safe Contact- someone whom you trust who will be there for you.  · Call your local CRISIS HOTLINE 523-1577 or 172-226-9567  · Call your local Children's Mobile Crisis Response Team [place] (675) 299-1883 or [employer]  · Call the toll free National Suicide Prevention Hotlines   · National Suicide Prevention Lifeline 544-433-CUWM (7337)  · National Hope Line Network 800-SUICIDE (559-1308)       Medication List      CHANGE how you take these medications        Instructions    Morning Afternoon Evening Bedtime    hydrocodone-acetaminophen 5-325 MG Tabs per tablet   What changed:  when to take this   Commonly known as:  NORCO        Take 1 Tab by mouth every 6 hours as needed.   Dose:  1 Tab                          CONTINUE taking these medications        Instructions    Morning Afternoon Evening Bedtime    alprazolam 0.5 MG Tabs   Commonly known as:  XANAX        Take 1 Tab by mouth 2 times a day as needed. FOR ANXIETY   Dose:  0.5 mg                        ibuprofen 200 MG Tabs   Commonly known  as:  MOTRIN        Take 400 mg by mouth every 6 hours as needed.   Dose:  400 mg                        oxycodone-acetaminophen 5-325 MG Tabs   Commonly known as:  PERCOCET        Take 1-2 Tabs by mouth every four hours as needed.   Dose:  1-2 Tab                             Where to Get Your Medications      You can get these medications from any pharmacy     Bring a paper prescription for each of these medications    - hydrocodone-acetaminophen 5-325 MG Tabs per tablet            Medication Information     Above and/or attached are the medications your physician expects you to take upon discharge. Review all of your home medications and newly ordered medications with your doctor and/or pharmacist. Follow medication instructions as directed by your doctor and/or pharmacist. Please keep your medication list with you and share with your physician. Update the information when medications are discontinued, doses are changed, or new medications (including over-the-counter products) are added; and carry medication information at all times in the event of emergency situations.        Resources     Quit Smoking / Tobacco Use:    I understand the use of any tobacco products increases my chance of suffering from future heart disease or stroke and could cause other illnesses which may shorten my life. Quitting the use of tobacco products is the single most important thing I can do to improve my health. For further information on smoking / tobacco cessation call a Toll Free Quit Line at 1-229.590.5668 (*[employer] [place]) or 1-351.623.1300 (American Lung Association) or you can access the web based program at www.lungusa.org.    [place] Tobacco Users Help Line:  (973) 139-9309       Toll Free: 1-897.331.4274  Quit Tobacco Program [facility] [phone]    Crisis Hotline:    [place] Crisis Hotline:  9-926-RIHEPLU or 1-480.937.4935    [place] Crisis Hotline:    1-170.105.2856 or  932.403.7285    Discharge Survey:   Thank you for choosing [facility]. We hope we did everything we could to make your hospital stay a pleasant one. You may be receiving a survey and we would appreciate your time and participation in answering the questions. Your input is very valuable to us in our efforts to improve our service to our patients and their families.            Signatures     My signature on this form indicates that:    1. I acknowledge receipt and understanding of these Home Care Instruction.  2. My questions regarding this information have been answered to my satisfaction.  3. I have formulated a plan with my discharge nurse to obtain my prescribed medications for home.    __________________________________      __________________________________                   Patient Signature                                 Guardian/Responsible Adult Signature      __________________________________                 __________       ________                       Nurse Signature                                               Date                 Time

## 2017-04-13 NOTE — OR NURSING
Pt to recovery, drowsy but otherwise appropriate. Medicated per MAR for tenderness in R groin/RLQ surgical site. Denies nausea, tolerating sips of clears. Dressing over incision CDI, cold pack applied. VSS on room air.

## 2017-04-13 NOTE — PROGRESS NOTES
The Medication Reconciliation process has been completed by interviewing the patient    Allergies have been reviewed  Antibiotic use in 30 days - NONE    Home Pharmacy:  [facility]

## 2017-04-13 NOTE — DISCHARGE SUMMARY
Hernia Repair Discharge Instructions:    1. ACTIVITIES: Upon discharge from the hospital, the day of surgery it is requested that you do no significant physical activity and limit mental activities, as you have had sedation. The day after surgery, you may resume activities of daily living, but for four weeks, it is recommended that you do no strenuous activities or heavy lifting (greater than 15 pounds).     2. DRIVING: You may drive whenever you are off pain medications and are able to perform the activities needed to drive, i.e. turning, bending, twisting, etc.     3. WOUND: It is not unusual for patients to experience swelling and even bruising at the hernia repair site. With inguinal hernias, sometimes the bruising and swelling may extend on to the penis or into the scrotum of male patients. This will resolve over the next few days.     4. ICE: please use ice on the wound to decrease the swelling for the first 24 hours and then discontinue.     5. BATHING: The dressing can be removed two days after surgery and the wound can then be wetted in a shower as normal, but avoid submersion in water (tub bath) for at least a week.    6. PAIN MEDICATION: You will be given a prescription for pain medication at discharge. Please take these as directed. It is important to remember not to take medications on an empty stomach as this may cause nausea.     7. BOWEL FUNCTION: After hernia repair, it is not uncommon for patients to experience constipation. This is due to decreasing activity levels as well as pain medications. You may wish to use a stool softener beginning immediately after surgery, and you may or may not need to use a laxative (Milk of Magnesia, Ex-lax; Senokot, etc.) as well.            8 .CALL IF YOU HAVE: (1) Fevers to more than 1010 F, (2) Unusual chest or leg pain, (3) Drainage or fluid from incision that may be foul smelling, increased tenderness or soreness at the wound or the wound edges are no longer  together,redness or swelling at the incision site. Please do not hesitate to call with any other questions.     9. APPOINTMENT: Contact our office at [phone] for a follow-up appointment in 1 to 2 weeks following your procedure.     If you have any additional questions, please do not hesitate to call the office and speak to either myself or the physician on call.     Office address:  [address] [place] [address], [place] [address]      Kwadwo Brewer M.D.  [facility]  [phone]

## 2017-04-13 NOTE — IP AVS SNAPSHOT
4/13/2017    Efraín Ayala  [address] [place] [address]    Dear Efraín:    [facility] wants to ensure your discharge home is safe and you or your loved ones have had all of your questions answered regarding your care after you leave the hospital.    Below is a list of resources and contact information should you have any questions regarding your hospital stay, follow-up instructions, or active medical symptoms.    Questions or Concerns Regarding… Contact   Medical Questions Related to Your Discharge  (7 days a week, 8am-5pm) Contact a Nurse Care Coordinator   [phone]   Medical Questions Not Related to Your Discharge  (24 hours a day / 7 days a week)  Contact the Nurse Health Line   [phone]    Medications or Discharge Instructions Refer to your discharge packet   or contact your [facility] Primary Care Provider   [phone]   Follow-up Appointment(s) Schedule your appointment via [employer]   or contact Scheduling [phone]   Billing Review your statement via [employer]  or contact Billing [phone]   Medical Records Review your records via [employer]   or contact Medical Records [phone]     You may receive a telephone call within two days of discharge. This call is to make certain you understand your discharge instructions and have the opportunity to have any questions answered. You can also easily access your medical information, test results and upcoming appointments via the [employer] free online health management tool. You can learn more and sign up at [email or web address]. For assistance setting up your [employer] account, please call [phone].    Once again, we want to ensure your discharge home is safe and that you have a clear understanding of any next steps in your care. If you have any questions or concerns, please do not hesitate to contact us, we are here for you. Thank you for choosing [facility] for your healthcare needs.    Sincerely,    Your [facility] Healthcare Team

## 2017-04-13 NOTE — DISCHARGE INSTRUCTIONS
ACTIVITY: Rest and take it easy for the first 24 hours.  A responsible adult is recommended to remain with you during that time.  It is normal to feel sleepy.  We encourage you to not do anything that requires balance, judgment or coordination.    MILD FLU-LIKE SYMPTOMS ARE NORMAL. YOU MAY EXPERIENCE GENERALIZED MUSCLE ACHES, THROAT IRRITATION, HEADACHE AND/OR SOME NAUSEA.    FOR 24 HOURS DO NOT:  Drive, operate machinery or run household appliances.  Drink beer or alcoholic beverages.   Make important decisions or sign legal documents.    SPECIAL INSTRUCTIONS:   Inguinal Hernia, Adult , Care After  Refer to this sheet in the next few weeks. These discharge instructions provide you with general information on caring for yourself after you leave the hospital. Your caregiver may also give you specific instructions. Your treatment has been planned according to the most current medical practices available, but unavoidable complications sometimes occur. If you have any problems or questions after discharge, please call your caregiver.  HOME CARE INSTRUCTIONS  · Put ice on the operative site.  ¨ Put ice in a plastic bag.  ¨ Place a towel between your skin and the bag.  ¨ Leave the ice on for 15-20 minutes at a time, 03-04 times a day while awake.  · Change bandages (dressings) as directed.  · Keep the wound dry and clean. The wound may be washed gently with soap and water. Gently blot or dab the wound dry. It is okay to take showers 24 to 48 hours after surgery. Do not take baths, use swimming pools, or use hot tubs for 10 days, or as directed by your caregiver.  · Only take over-the-counter or prescription medicines for pain, discomfort, or fever as directed by your caregiver.  · Continue your normal diet as directed.  · Do not lift anything more than 10 pounds or play contact sports for 3 weeks, or as directed.  SEEK MEDICAL CARE IF:  · There is redness, swelling, or increasing pain in the wound.  · There is fluid  (pus) coming from the wound.  · There is drainage from a wound lasting longer than 1 day.  · You have an oral temperature above 102° F (38.9° C).  · You notice a bad smell coming from the wound or dressing.  · The wound breaks open after the stitches (sutures) have been removed.  · You notice increasing pain in the shoulders (shoulder strap areas).  · You develop dizzy episodes or fainting while standing.  · You feel sick to your stomach (nauseous) or throw up (vomit).  SEEK IMMEDIATE MEDICAL CARE IF:  · You develop a rash.  · You have difficulty breathing.  · You develop a reaction or have side effects to medicines you were given.  MAKE SURE YOU:   · Understand these instructions.  · Will watch your condition.  · Will get help right away if you are not doing well or get worse.     This information is not intended to replace advice given to you by your health care provider. Make sure you discuss any questions you have with your health care provider.       DIET: To avoid nausea, slowly advance diet as tolerated, avoiding spicy or greasy foods for the first day.  Add more substantial food to your diet according to your physician's instructions. INCREASE FLUIDS AND FIBER TO AVOID CONSTIPATION.    SURGICAL DRESSING/BATHING: Follow instructions given to you by your surgeon    FOLLOW-UP APPOINTMENT:  A follow-up appointment should be arranged with your doctor; call to schedule.    You should CALL YOUR PHYSICIAN if you develop:  Fever greater than 101 degrees F.  Pain not relieved by medication, or persistent nausea or vomiting.  Excessive bleeding (blood soaking through dressing) or unexpected drainage from the wound.  Extreme redness or swelling around the incision site, drainage of pus or foul smelling drainage.  Inability to urinate or empty your bladder within 8 hours.  Problems with breathing or chest pain.    You should call 911 if you develop problems with breathing or chest pain.  If you are unable to contact your  doctor or surgical center, you should go to the nearest emergency room or urgent care center.  Physician's telephone #: Dr. Brewer [phone]    If any questions arise, call your doctor.  If your doctor is not available, please feel free to call the Surgical Center at [phone].  The Center is open Monday through Friday from 7AM to 7PM.  You can also call the HEALTH HOTLINE open 24 hours/day, 7 days/week and speak to a nurse at [phone], or toll free at [phone].    A registered nurse may call you a few days after your surgery to see how you are doing after your procedure.    MEDICATIONS: Resume taking daily medication.  Take prescribed pain medication with food.  If no medication is prescribed, you may take non-aspirin pain medication if needed.  PAIN MEDICATION CAN BE VERY CONSTIPATING.  Take a stool softener or laxative such as senokot, pericolace, or milk of magnesia if needed.    Prescription given for **Norco*.  Last pain medication given at **2:35 pm.    If your physician has prescribed pain medication that includes Acetaminophen (Tylenol), do not take additional Acetaminophen (Tylenol) while taking the prescribed medication.    Depression / Suicide Risk    As you are discharged from this [facility] facility, it is important to learn how to keep safe from harming yourself.    Recognize the warning signs:  · Abrupt changes in personality, positive or negative- including increase in energy   · Giving away possessions  · Change in eating patterns- significant weight changes-  positive or negative  · Change in sleeping patterns- unable to sleep or sleeping all the time   · Unwillingness or inability to communicate  · Depression  · Unusual sadness, discouragement and loneliness  · Talk of wanting to die  · Neglect of personal appearance   · Rebelliousness- reckless behavior  · Withdrawal from people/activities they love  · Confusion- inability to concentrate     If you or a loved one  observes any of these behaviors or has concerns about self-harm, here's what you can do:  · Talk about it- your feelings and reasons for harming yourself  · Remove any means that you might use to hurt yourself (examples: pills, rope, extension cords, firearm)  · Get professional help from the community (Mental Health, Substance Abuse, psychological counseling)  · Do not be alone:Call your Safe Contact- someone whom you trust who will be there for you.  · Call your local CRISIS HOTLINE 300-0635 or 695-726-6257  · Call your local Children's Mobile Crisis Response Team [place] (317) 234-8371 or www.[employer]  · Call the toll free National Suicide Prevention Hotlines   · National Suicide Prevention Lifeline 514-092-CHKD (1024)  · National Hope Line Network 800-SUICIDE (344-2440)

## 2017-04-19 NOTE — ED PROVIDER NOTES
ED Provider Note    CHIEF COMPLAINT  Chief Complaint   Patient presents with   • Hernia     Seen at . US showed sm rt inguinal hernia containing bowel and fat.        HPI  Efraín Ayala is a 62 y.o. male who presents to the emergency department for further evaluation of right inguinal hernia. Patient was seen at outpatient urgent care and was ultimately referred to the ER for ongoing inpatient care. Patient does complain of minimal to moderate discomfort in this area. Denies any other dermal changes. Denies any nausea or vomiting. Abdominal pain. No back pain. Passing gas and flatus. No urinary changes.    REVIEW OF SYSTEMS  See HPI for further details. All other systems are negative.     PAST MEDICAL HISTORY   has a past medical history of Seasonal allergies (3/11/2015); Anxiety (3/11/2015); Snoring; Psychiatric problem (2017); and Arthritis (2017).    SOCIAL HISTORY  Social History     Social History Main Topics   • Smoking status: Former Smoker -- 0.50 packs/day for 15 years     Types: Cigarettes   • Smokeless tobacco: Never Used      Comment: Quit 1986   • Alcohol Use: 10.5 oz/week     21 Cans of beer per week      Comment: 3-4 beers daily    • Drug Use: No      Comment: cannabis   • Sexual Activity:     Partners: Female       SURGICAL HISTORY   has past surgical history that includes tibia orif (1992) and inguinal hernia repair (Right, 4/13/2017).    CURRENT MEDICATIONS  Home Medications     Reviewed by Aide Lorenzo R.N. (Registered Nurse) on 03/30/17 at 1802  Med List Status: Complete    Medication Last Dose Status    alprazolam (XANAX) 0.5 MG Tab 3/30/2017 Active    fluticasone (FLONASE) 50 MCG/ACT nasal spray prn Active    hydrocodone-acetaminophen (NORCO) 5-325 MG Tab per tablet 3/29/2017 Active    ibuprofen (MOTRIN) 200 MG Tab 3/30/2017 Active                ALLERGIES  Allergies   Allergen Reactions   • Other Misc Hives     Per wife pt allergic to NITRILE golves, tolerates latex gloves   •  "Shellfish Allergy Hives       PHYSICAL EXAM  VITAL SIGNS: /93 mmHg  Pulse 65  Temp(Src) 36.6 °C (97.9 °F)  Resp 16  Ht 1.93 m (6' 4\")  Wt 79.5 kg (175 lb 4.3 oz)  BMI 21.34 kg/m2   Pulse ox interpretation:I interpret this pulse ox as normal.  Constitutional: Alert in no apparent distress.  HENT: No signs of trauma, Bilateral external ears normal, Nose normal.   Eyes: Pupils are equal and reactive, Conjunctiva normal, Non-icteric.   Neck: Normal range of motion, No tenderness, Supple, No stridor.   Lymphatic: No lymphadenopathy noted.   Cardiovascular: Regular rate and rhythm, no murmurs.   Thorax & Lungs: Normal breath sounds, No respiratory distress, No wheezing, No chest tenderness.   Abdomen: Bowel sounds normal, Soft, No tenderness, no peritoneal signs, minimally palpated right inguinal hernia.  Skin: Warm, Dry, No erythema, No rash.   Back: No bony tenderness, No CVA tenderness.   Extremities: Intact distal pulses, No edema, No tenderness, No cyanosis,  Negative Saroj's sign.   Musculoskeletal: Good range of motion in all major joints. No tenderness to palpation or major deformities noted.   Neurologic: Alert , Normal motor function, Normal sensory function, No focal deficits noted.   Psychiatric: Affect normal, Judgment normal, Mood normal.       DIAGNOSTIC STUDIES / PROCEDURES      LABS  Results for orders placed or performed in visit on 03/30/17   POCT Urinalysis   Result Value Ref Range    POC Color light yellow Negative    POC Appearance clear Negative    POC Leukocyte Esterase neg Negative    POC Nitrites neg Negative    POC Urobiligen neg Negative (0.2) mg/dL    POC Protein neg Negative mg/dL    POC Urine PH 6.0 5.0 - 8.0    POC Blood neg Negative    POC Specific Gravity 1.005 <1.005 - >1.030    POC Ketones neg Negative mg/dL    POC Biliruben neg Negative mg/dL    POC Glucose neg Negative mg/dL         RADIOLOGY  No orders to display           COURSE & MEDICAL DECISION MAKING  Pertinent Labs " & Imaging studies reviewed. (See chart for details)  Patient was admitted in transfer for further evaluation of right inguinal hernia. History of physical exams above. There is no evidence of strength or lesion or incarceration. I do believe the patient can continue with an outpatient course being followed by general surgery. Patient is presenting return precautions if needed.    The patient will not drink alcohol nor drive with prescribed medications. The patient will return for worsening symptoms and is stable at the time of discharge. The patient verbalizes understanding and will comply.    FINAL IMPRESSION  1. Right inguinal hernia            Electronically signed by: Jose Cross, 4/18/2017 9:03 PM

## 2017-06-14 ENCOUNTER — PATIENT MESSAGE (OUTPATIENT)
Dept: MEDICAL GROUP | Facility: MEDICAL CENTER | Age: 62
End: 2017-06-14

## 2017-07-14 ENCOUNTER — PATIENT MESSAGE (OUTPATIENT)
Dept: MEDICAL GROUP | Facility: MEDICAL CENTER | Age: 62
End: 2017-07-14

## 2017-07-14 DIAGNOSIS — Z12.83 SKIN CANCER SCREENING: ICD-10-CM

## 2017-07-14 NOTE — TELEPHONE ENCOUNTER
From: Efraín Ayala  To: Binu Montemayor M.D.  Sent: 7/14/2017 1:36 PM PDT  Subject: Non-Urgent Medical Question    I need a referral to a dermatologist for a black mole and some skin issues. Do I need to see you first?

## 2017-08-22 ENCOUNTER — OFFICE VISIT (OUTPATIENT)
Dept: MEDICAL GROUP | Facility: MEDICAL CENTER | Age: 62
End: 2017-08-22
Payer: COMMERCIAL

## 2017-08-22 VITALS
TEMPERATURE: 98.4 F | WEIGHT: 172 LBS | DIASTOLIC BLOOD PRESSURE: 82 MMHG | SYSTOLIC BLOOD PRESSURE: 122 MMHG | BODY MASS INDEX: 21.39 KG/M2 | OXYGEN SATURATION: 97 % | RESPIRATION RATE: 16 BRPM | HEART RATE: 85 BPM | HEIGHT: 75 IN

## 2017-08-22 DIAGNOSIS — F41.9 ANXIETY: ICD-10-CM

## 2017-08-22 DIAGNOSIS — G89.29 CHRONIC PAIN OF LEFT KNEE: ICD-10-CM

## 2017-08-22 DIAGNOSIS — M25.562 CHRONIC PAIN OF LEFT KNEE: ICD-10-CM

## 2017-08-22 PROCEDURE — 99214 OFFICE O/P EST MOD 30 MIN: CPT | Performed by: FAMILY MEDICINE

## 2017-08-22 RX ORDER — ALPRAZOLAM 0.5 MG/1
0.5 TABLET ORAL 2 TIMES DAILY PRN
Qty: 180 TAB | Refills: 1 | Status: SHIPPED
Start: 2017-08-22 | End: 2017-08-31 | Stop reason: SDUPTHER

## 2017-08-22 RX ORDER — TRAMADOL HYDROCHLORIDE 50 MG/1
50-100 TABLET ORAL EVERY 8 HOURS PRN
Qty: 60 TAB | Refills: 2 | Status: SHIPPED
Start: 2017-08-22 | End: 2017-09-11 | Stop reason: SDUPTHER
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
## 2017-08-22 NOTE — MR AVS SNAPSHOT
"        Efraín Ayala   2017 10:40 AM   Office Visit   MRN: [id number]    Department:  [facility]   Dept Phone:  [phone]    Description:  Male : 1955   Provider:  Binu Montemayor M.D.           Reason for Visit     Knee Pain left side      Allergies as of 2017     Allergen Noted Reactions    Other Misc 2017   Hives    Per wife pt allergic to NITRILE golves, tolerates latex gloves    Shellfish Allergy 2015   Hives      You were diagnosed with     Chronic pain of left knee   [522071]       Anxiety   [484144]         Vital Signs     Blood Pressure Pulse Temperature Respirations Height Weight    122/82 mmHg 85 36.9 °C (98.4 °F) 16 1.905 m (6' 3\") 78.019 kg (172 lb)    Body Mass Index Oxygen Saturation Smoking Status             21.50 kg/m2 97% Former Smoker         Basic Information     Date Of Birth Sex Race Ethnicity Preferred Language    1955 Male White Non- English      Problem List              ICD-10-CM Priority Class Noted - Resolved    Seasonal allergies J30.2   3/11/2015 - Present    Anxiety F41.9   3/11/2015 - Present    Elevated fasting blood sugar R73.01   3/11/2015 - Present    QT prolongation R94.31   2015 - Present    Right inguinal hernia K40.90   2017 - Present    Chronic pain of left knee M25.562, G89.29   2017 - Present      Health Maintenance        Date Due Completion Dates    IMM INFLUENZA (1) 2017    COLONOSCOPY 2020    IMM DTaP/Tdap/Td Vaccine (2 - Td) 3/14/2027 3/14/2017            Current Immunizations     Influenza Vaccine Quad Inj (Pf) 2015    SHINGLES VACCINE 2016    Tdap Vaccine 3/14/2017 10:38 AM      Below and/or attached are the medications your provider expects you to take. Review all of your home medications and newly ordered medications with your provider and/or pharmacist. Follow medication instructions as directed by your provider and/or pharmacist. Please keep your " medication list with you and share with your provider. Update the information when medications are discontinued, doses are changed, or new medications (including over-the-counter products) are added; and carry medication information at all times in the event of emergency situations     Allergies:  OTHER MISC - Hives     SHELLFISH ALLERGY - Hives               Medications  Valid as of: August 22, 2017 - 11:06 AM    Generic Name Brand Name Tablet Size Instructions for use    ALPRAZolam (Tab) XANAX 0.5 MG Take 1 Tab by mouth 2 times a day as needed for Anxiety.        Ibuprofen (Tab) MOTRIN 200 MG Take 400 mg by mouth every 6 hours as needed.        TraMADol HCl (Tab) ULTRAM 50 MG Take 1-2 Tabs by mouth every 8 hours as needed for Severe Pain.        .                 Medicines prescribed today were sent to:     [facility] [address] [place] [address] [place], [place] - [address] AT [facility]. & [address] [place]    [address] [facility] [address]    Phone: [phone] Fax: [phone]    Open 24 Hours?: No      Medication refill instructions:       If your prescription bottle indicates you have medication refills left, it is not necessary to call your provider’s office. Please contact your pharmacy and they will refill your medication.    If your prescription bottle indicates you do not have any refills left, you may request refills at any time through one of the following ways: The online [facility] system (except Urgent Care), by calling your provider’s office, or by asking your pharmacy to contact your provider’s office with a refill request. Medication refills are processed only during regular business hours and may not be available until the next business day. Your provider may request additional information or to have a follow-up visit with you prior to refilling your medication.   *Please Note: Medication refills are assigned a new Rx number when refilled electronically. Your pharmacy may indicate that no  refills were authorized even though a new prescription for the same medication is available at the pharmacy. Please request the medicine by name with the pharmacy before contacting your provider for a refill.           [employer] Access Code: Activation code not generated  Current Growisht Status: Active
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
## 2017-08-31 DIAGNOSIS — F41.9 ANXIETY: ICD-10-CM

## 2017-08-31 RX ORDER — ALPRAZOLAM 0.5 MG/1
TABLET ORAL
Qty: 180 TAB | Refills: 1 | Status: SHIPPED
Start: 2017-08-31 | End: 2018-03-26 | Stop reason: SDUPTHER

## 2017-09-11 ENCOUNTER — PATIENT MESSAGE (OUTPATIENT)
Dept: MEDICAL GROUP | Facility: MEDICAL CENTER | Age: 62
End: 2017-09-11

## 2017-09-11 DIAGNOSIS — G89.29 CHRONIC PAIN OF LEFT KNEE: ICD-10-CM

## 2017-09-11 DIAGNOSIS — M25.562 CHRONIC PAIN OF LEFT KNEE: ICD-10-CM

## 2017-09-11 RX ORDER — TRAMADOL HYDROCHLORIDE 50 MG/1
50-100 TABLET ORAL EVERY 8 HOURS PRN
Qty: 60 TAB | Refills: 2 | Status: SHIPPED
Start: 2017-09-11 | End: 2018-03-26

## 2017-09-11 NOTE — TELEPHONE ENCOUNTER
From: Efraín Ayala  To: Binu Montemayor M.D.  Sent: 9/11/2017 9:57 AM PDT  Subject: Prescription Question    Yohana did not get my pain RX.

## 2017-09-15 ENCOUNTER — NON-PROVIDER VISIT (OUTPATIENT)
Dept: MEDICAL GROUP | Facility: MEDICAL CENTER | Age: 62
End: 2017-09-15
Payer: COMMERCIAL

## 2017-09-15 VITALS — HEART RATE: 56 BPM | OXYGEN SATURATION: 98 %

## 2017-09-15 DIAGNOSIS — Z23 NEED FOR IMMUNIZATION AGAINST INFLUENZA: ICD-10-CM

## 2017-09-15 PROCEDURE — 90686 IIV4 VACC NO PRSV 0.5 ML IM: CPT | Performed by: FAMILY MEDICINE

## 2017-09-15 PROCEDURE — 90471 IMMUNIZATION ADMIN: CPT | Performed by: FAMILY MEDICINE

## 2017-09-15 NOTE — PROGRESS NOTES
"Efraín Ayala is a 62 y.o. male here for a non-provider visit for:   FLU    Reason for immunization: Annual Flu Vaccine  Immunization records indicate need for vaccine: Yes, confirmed with Epic  Minimum interval has been met for this vaccine: Yes  ABN completed: No    Order and dose verified by: LEMUEL MOORE Dated  08/07/2015 was given to patient: Yes  All IAC Questionnaire questions were answered \"No.\"    Patient tolerated injection and no adverse effects were observed or reported: Yes    Pt scheduled for next dose in series: No    "

## 2018-02-28 ENCOUNTER — PATIENT MESSAGE (OUTPATIENT)
Dept: MEDICAL GROUP | Facility: MEDICAL CENTER | Age: 63
End: 2018-02-28

## 2018-02-28 DIAGNOSIS — Z00.00 ANNUAL PHYSICAL EXAM: ICD-10-CM

## 2018-02-28 DIAGNOSIS — R73.01 ELEVATED FASTING BLOOD SUGAR: ICD-10-CM

## 2018-02-28 NOTE — TELEPHONE ENCOUNTER
1. Caller Name: Pt                                         Call Back Number: [phone] (home)         Patient approves a detailed voicemail message: N\A    2. SPECIFIC Action To Be Taken: Labs requested    3. Diagnosis/Clinical Reason for Request: Pt requesting labs to complete before his next Annual Exam    4. Specialty & Provider Name/Lab/Imaging Location: None specified    5. Is appointment scheduled for requested order/referral: no    Patient informed they will receive a return phone call from the office ONLY if there are any questions before processing their request. Advised to call back if they haven't received a call from the referral department in 5 days.

## 2018-02-28 NOTE — TELEPHONE ENCOUNTER
From: Efraín Ayala  To: Binu Montemayor M.D.  Sent: 2/28/2018 9:45 AM PST  Subject: Non-Urgent Medical Question    Hi, I have my annual with you scheduled for the end of March. I’d like to have my blood work done before the visit, please.  Thank you,  Efraín Ayala

## 2018-03-16 ENCOUNTER — HOSPITAL ENCOUNTER (OUTPATIENT)
Dept: LAB | Facility: MEDICAL CENTER | Age: 63
End: 2018-03-16
Attending: FAMILY MEDICINE
Payer: MEDICARE

## 2018-03-16 DIAGNOSIS — Z00.00 ANNUAL PHYSICAL EXAM: ICD-10-CM

## 2018-03-16 DIAGNOSIS — R73.01 ELEVATED FASTING BLOOD SUGAR: ICD-10-CM

## 2018-03-16 LAB
ALBUMIN SERPL BCP-MCNC: 4.8 G/DL (ref 3.2–4.9)
ALBUMIN/GLOB SERPL: 1.8 G/DL
ALP SERPL-CCNC: 35 U/L (ref 30–99)
ALT SERPL-CCNC: 21 U/L (ref 2–50)
ANION GAP SERPL CALC-SCNC: 7 MMOL/L (ref 0–11.9)
AST SERPL-CCNC: 20 U/L (ref 12–45)
BILIRUB SERPL-MCNC: 0.7 MG/DL (ref 0.1–1.5)
BUN SERPL-MCNC: 12 MG/DL (ref 8–22)
CALCIUM SERPL-MCNC: 9.6 MG/DL (ref 8.5–10.5)
CHLORIDE SERPL-SCNC: 103 MMOL/L (ref 96–112)
CHOLEST SERPL-MCNC: 205 MG/DL (ref 100–199)
CO2 SERPL-SCNC: 27 MMOL/L (ref 20–33)
CREAT SERPL-MCNC: 0.82 MG/DL (ref 0.5–1.4)
EST. AVERAGE GLUCOSE BLD GHB EST-MCNC: 120 MG/DL
GLOBULIN SER CALC-MCNC: 2.7 G/DL (ref 1.9–3.5)
GLUCOSE SERPL-MCNC: 77 MG/DL (ref 65–99)
HBA1C MFR BLD: 5.8 % (ref 0–5.6)
HDLC SERPL-MCNC: 73 MG/DL
LDLC SERPL CALC-MCNC: 121 MG/DL
POTASSIUM SERPL-SCNC: 4.3 MMOL/L (ref 3.6–5.5)
PROT SERPL-MCNC: 7.5 G/DL (ref 6–8.2)
SODIUM SERPL-SCNC: 137 MMOL/L (ref 135–145)
TRIGL SERPL-MCNC: 57 MG/DL (ref 0–149)
TSH SERPL DL<=0.005 MIU/L-ACNC: 2.48 UIU/ML (ref 0.38–5.33)

## 2018-03-16 PROCEDURE — 84443 ASSAY THYROID STIM HORMONE: CPT

## 2018-03-16 PROCEDURE — 36415 COLL VENOUS BLD VENIPUNCTURE: CPT

## 2018-03-16 PROCEDURE — 80061 LIPID PANEL: CPT

## 2018-03-16 PROCEDURE — 83036 HEMOGLOBIN GLYCOSYLATED A1C: CPT

## 2018-03-16 PROCEDURE — 80053 COMPREHEN METABOLIC PANEL: CPT

## 2018-03-26 ENCOUNTER — OFFICE VISIT (OUTPATIENT)
Dept: MEDICAL GROUP | Facility: MEDICAL CENTER | Age: 63
End: 2018-03-26
Payer: MEDICARE

## 2018-03-26 VITALS
RESPIRATION RATE: 12 BRPM | HEIGHT: 75 IN | OXYGEN SATURATION: 99 % | SYSTOLIC BLOOD PRESSURE: 118 MMHG | BODY MASS INDEX: 22.13 KG/M2 | TEMPERATURE: 97.6 F | DIASTOLIC BLOOD PRESSURE: 72 MMHG | WEIGHT: 178 LBS | HEART RATE: 92 BPM

## 2018-03-26 DIAGNOSIS — Z12.5 PROSTATE CANCER SCREENING: ICD-10-CM

## 2018-03-26 DIAGNOSIS — R73.03 PREDIABETES: ICD-10-CM

## 2018-03-26 DIAGNOSIS — Z00.00 WELCOME TO MEDICARE PREVENTIVE VISIT: ICD-10-CM

## 2018-03-26 DIAGNOSIS — G89.29 CHRONIC PAIN OF LEFT KNEE: ICD-10-CM

## 2018-03-26 DIAGNOSIS — Z23 NEED FOR VACCINATION: ICD-10-CM

## 2018-03-26 DIAGNOSIS — M25.562 CHRONIC PAIN OF LEFT KNEE: ICD-10-CM

## 2018-03-26 DIAGNOSIS — R94.31 QT PROLONGATION: ICD-10-CM

## 2018-03-26 DIAGNOSIS — J30.1 CHRONIC SEASONAL ALLERGIC RHINITIS DUE TO POLLEN: ICD-10-CM

## 2018-03-26 DIAGNOSIS — F41.9 ANXIETY: ICD-10-CM

## 2018-03-26 DIAGNOSIS — R73.01 ELEVATED FASTING BLOOD SUGAR: ICD-10-CM

## 2018-03-26 DIAGNOSIS — N40.0 BENIGN PROSTATIC HYPERPLASIA WITHOUT LOWER URINARY TRACT SYMPTOMS: ICD-10-CM

## 2018-03-26 DIAGNOSIS — E78.00 PURE HYPERCHOLESTEROLEMIA: ICD-10-CM

## 2018-03-26 PROBLEM — K40.90 RIGHT INGUINAL HERNIA: Status: RESOLVED | Noted: 2017-04-13 | Resolved: 2018-03-26

## 2018-03-26 PROCEDURE — G0009 ADMIN PNEUMOCOCCAL VACCINE: HCPCS | Performed by: FAMILY MEDICINE

## 2018-03-26 PROCEDURE — 90670 PCV13 VACCINE IM: CPT | Performed by: FAMILY MEDICINE

## 2018-03-26 PROCEDURE — G0402 INITIAL PREVENTIVE EXAM: HCPCS | Mod: 25 | Performed by: FAMILY MEDICINE

## 2018-03-26 PROCEDURE — G0403 EKG FOR INITIAL PREVENT EXAM: HCPCS | Performed by: FAMILY MEDICINE

## 2018-03-26 RX ORDER — ALPRAZOLAM 0.5 MG/1
0.5 TABLET ORAL 2 TIMES DAILY PRN
Qty: 180 TAB | Refills: 1 | Status: SHIPPED
Start: 2018-03-26 | End: 2018-06-24

## 2018-03-26 ASSESSMENT — ACTIVITIES OF DAILY LIVING (ADL): BATHING_REQUIRES_ASSISTANCE: 0

## 2018-03-26 ASSESSMENT — PATIENT HEALTH QUESTIONNAIRE - PHQ9: CLINICAL INTERPRETATION OF PHQ2 SCORE: 0

## 2018-03-26 NOTE — PROGRESS NOTES
HPI:  Efraín Ayala is a 63 y.o. here for Medicare Annual Wellness Visit     Patient Active Problem List    Diagnosis Date Noted   • Pure hypercholesterolemia 03/26/2018   • Prediabetes 03/26/2018   • Benign prostatic hyperplasia without lower urinary tract symptoms 03/26/2018   • Chronic pain of left knee 08/22/2017   • QT prolongation 04/16/2015   • Seasonal allergies 03/11/2015   • Anxiety 03/11/2015   • Elevated fasting blood sugar 03/11/2015       Current Outpatient Prescriptions   Medication Sig Dispense Refill   • ALPRAZolam (XANAX) 0.5 MG Tab Take 1 Tab by mouth 2 times a day as needed for Anxiety for up to 90 days. 180 Tab 1   • ibuprofen (MOTRIN) 200 MG Tab Take 400 mg by mouth every 6 hours as needed.       No current facility-administered medications for this visit.             Current supplements as per medication list.       Allergies: Other misc and Shellfish allergy    Current social contact/activities: .  1 child in [place]. 1 child in [place]. 1 child in [place].  Patient's perception of their health: good    He  reports that he has quit smoking. His smoking use included Cigarettes. He has a 7.50 pack-year smoking history. He has never used smokeless tobacco. He reports that he drinks about 8.4 oz of alcohol per week . He reports that he does not use drugs.  Counseling given: Not Answered        DPA/Advanced Directive:  Patient does not have an Advanced Directive.  A packet and workshop information was given on Advanced Directives.      ROS:    Gait: Uses no assistive device   Ostomy: no   Other tubes: no   Amputations: no   Chronic oxygen use: no   Last eye exam:  1 year ago  : Denies any urinary leakage during the last 6 months incontinence.         Depression Screening    Little interest or pleasure in doing things?  0 - not at all  Feeling down, depressed , or hopeless? 0 - not at all  Patient Health Questionnaire Score: 0     If depressive symptoms identified deferred to  follow up visit unless specifically addressed in assessment and plan.    Interpretation of PHQ-9 Total Score   Score Severity   1-4 No Depression   5-9 Mild Depression   10-14 Moderate Depression   15-19 Moderately Severe Depression   20-27 Severe Depression    Screening for Cognitive Impairment    Three Minute Recall (apple, watch, steven)  1/3    Draw clock face with all 12 numbers set to the hand to show 10 minutes past 11 o'clock  1    Cognitive concerns identified deferred for follow up unless specifically addressed in assessment and plan.    Fall Risk Assessment    Has the patient had two or more falls in the last year or any fall with injury in the last year?  No    Safety Assessment    Throw rugs on floor.  Yes  Handrails on all stairs.  No  Good lighting in all hallways.  Yes  Difficulty hearing.  Yes  Patient counseled about all safety risks that were identified.    Functional Assessment ADLs    Are there any barriers preventing you from cooking for yourself or meeting nutritional needs?  No.    Are there any barriers preventing you from driving safely or obtaining transportation?  No.    Are there any barriers preventing you from using a telephone or calling for help?  No.    Are there any barriers preventing you from shopping?  No.    Are there any barriers preventing you from taking care of your own finances?  No.    Are there any barriers preventing you from managing your medications?  No.    Are there any barriers preventing you from showering, bathing or dressing yourself?  No.    Are currently engaging any exercise or physical activity?  Yes.       Health Maintenance Summary                IMM PNEUMOCOCCAL 65+ (ADULT) LOW/MEDIUM RISK SERIES Overdue 1/6/2018     Annual Wellness Visit Overdue 1/6/2018     COLONOSCOPY Next Due 5/14/2020      Done 5/14/2015 AMB REFERRAL TO GI FOR COLONOSCOPY    IMM DTaP/Tdap/Td Vaccine Next Due 3/14/2027      Done 3/14/2017 Imm Admin: Tdap Vaccine          Patient Care  "Team:  Binu Montemayor M.D. as PCP - General (Family Medicine)        Social History   Substance Use Topics   • Smoking status: Former Smoker     Packs/day: 0.50     Years: 15.00     Types: Cigarettes   • Smokeless tobacco: Never Used      Comment: Quit 1986   • Alcohol use 8.4 oz/week     14 Cans of beer per week      Comment: 2 beers daily      History reviewed. No pertinent family history.  He  has a past medical history of Anxiety (3/11/2015); Arthritis (2017); Psychiatric problem (2017); Seasonal allergies (3/11/2015); and Snoring.   Past Surgical History:   Procedure Laterality Date   • INGUINAL HERNIA REPAIR Right 4/13/2017    Procedure: INGUINAL HERNIA REPAIR;  Surgeon: Kwadwo Brewer M.D.;  Location: SURGERY [facility];  Service:    • TIBIA ORIF  1992    Left       Exam:     Blood pressure 118/72, pulse 92, temperature 36.4 °C (97.6 °F), resp. rate 12, height 1.905 m (6' 3\"), weight 80.7 kg (178 lb), SpO2 99 %. Body mass index is 22.25 kg/m².    Constitutional: Alert, no distress.  Skin: Warm, dry, good turgor, no rashes in visible areas.  Eye: Equal, round and reactive, conjunctiva clear, lids normal.  Respiratory: Unlabored respiratory effort, lungs clear to auscultation, no wheezes, no ronchi.  Cardiovascular: Normal S1, S2, no murmur, no edema.  Psych: Alert and oriented x3, normal affect and mood.  Rectal: No external lesions, normal sphincter tone, moderately enlarged prostate with no nodularity.      EKG Interpretation-HR is 69 left atrial enlargement.        Assessment and Plan. The following treatment and monitoring plan is recommended:    1. Welcome to Medicare preventive visit  No concerns on EKG. No concerns with memory, per patient and patient's wife.  Advised patient to reduce alcohol intake.  - EKG  - Initial Preventive Exam ()    2. Prediabetes  Patient admits to eating a lot of sweets recently. Advised patient to adjust diet and follow-up with labs annually.  - Initial Preventive " Exam ()    3. Elevated fasting blood sugar  Advised diet and exercise.  - Initial Preventive Exam ()    4. Pure hypercholesterolemia  Advised patient to improve diet and despite regular exercise.  - Initial Preventive Exam ()    5. QT prolongation  Borderline. Continue to monitor with EKG every couple years.  - Initial Preventive Exam ()    6. Prostate cancer screening  Check PSA and message with results.  - PROSTATE SPECIFIC AG SCREENING; Future  - Initial Preventive Exam ()    7. Chronic pain of left knee  Mild. Discussed contraindication of tramadol and Xanax. Patient states he no longer needs tramadol.  - Initial Preventive Exam ()    8. Anxiety  Controlled. Patient has failed SSRIs, they caused increased anxiety. Continue Xanax 0.5 mg twice a day, which is working for him.  - ALPRAZolam (XANAX) 0.5 MG Tab; Take 1 Tab by mouth 2 times a day as needed for Anxiety for up to 90 days.  Dispense: 180 Tab; Refill: 1  - Initial Preventive Exam ()    9. Chronic seasonal allergic rhinitis due to pollen  Controlled with as needed Flonase.  - Initial Preventive Exam ()    10. Need for vaccination  Prevnar 13 given today.  - PNEUMOCOCCAL CONJUGATE VACCINE 13-VALENT  - Initial Preventive Exam ()    11. Benign prostatic hyperplasia without lower urinary tract symptoms  Asymptomatic. Advised patient continue monitoring for symptoms.  - Initial Preventive Exam ()        Services suggested: No services needed at this time  Health Care Screening: Age-appropriate preventive services recommended by USPTF and ACIP covered by Medicare were discussed today. Services ordered if indicated and agreed upon by the patient.  Referrals offered: Community-based lifestyle interventions to reduce health risks and promote self-management and wellness, fall prevention, nutrition, physical activity, tobacco-use cessation, weight loss, and mental health services as per orders if  indicated.    Discussion today about general wellness and lifestyle habits:    · Prevent falls and reduce trip hazards; Cautioned about securing or removing rugs.  · Have a working fire alarm and carbon monoxide detector;   · Engage in regular physical activity and social activities       Follow-up: Return in about 1 year (around 3/26/2019) for Annual.

## 2018-04-04 ENCOUNTER — HOSPITAL ENCOUNTER (OUTPATIENT)
Dept: LAB | Facility: MEDICAL CENTER | Age: 63
End: 2018-04-04
Attending: FAMILY MEDICINE
Payer: MEDICARE

## 2018-04-04 DIAGNOSIS — Z12.5 PROSTATE CANCER SCREENING: ICD-10-CM

## 2018-04-04 LAB — PSA SERPL-MCNC: 4.4 NG/ML (ref 0–4)

## 2018-04-04 PROCEDURE — 84153 ASSAY OF PSA TOTAL: CPT

## 2018-04-04 PROCEDURE — 36415 COLL VENOUS BLD VENIPUNCTURE: CPT

## 2018-04-05 ENCOUNTER — PATIENT MESSAGE (OUTPATIENT)
Dept: MEDICAL GROUP | Facility: MEDICAL CENTER | Age: 63
End: 2018-04-05

## 2018-04-05 DIAGNOSIS — R97.20 ELEVATED PSA: ICD-10-CM

## 2019-02-26 ENCOUNTER — HOSPITAL ENCOUNTER (OUTPATIENT)
Dept: LAB | Facility: MEDICAL CENTER | Age: 64
End: 2019-02-26
Attending: PHYSICIAN ASSISTANT
Payer: MEDICARE

## 2019-02-26 LAB — PSA SERPL-MCNC: 4.71 NG/ML (ref 0–4)

## 2019-02-26 PROCEDURE — 84153 ASSAY OF PSA TOTAL: CPT

## 2019-02-26 PROCEDURE — 36415 COLL VENOUS BLD VENIPUNCTURE: CPT

## 2019-02-28 DIAGNOSIS — F41.9 ANXIETY: ICD-10-CM

## 2019-02-28 RX ORDER — ALPRAZOLAM 0.5 MG/1
0.5 TABLET ORAL 2 TIMES DAILY PRN
Qty: 180 TAB | Refills: 1 | Status: SHIPPED
Start: 2019-02-28 | End: 2019-05-29

## 2019-03-25 ENCOUNTER — TELEPHONE (OUTPATIENT)
Dept: MEDICAL GROUP | Facility: MEDICAL CENTER | Age: 64
End: 2019-03-25

## 2019-03-25 NOTE — TELEPHONE ENCOUNTER
ESTABLISHED PATIENT PRE-VISIT PLANNING     Patient was NOT contacted to complete PVP.     Note: Patient will not be contacted if there is no indication to call.     1.  Reviewed notes from the last few office visits within the medical group: Yes    2.  If any orders were placed at last visit or intended to be done for this visit (i.e. 6 mos follow-up), do we have Results/Consult Notes?        •  Labs - Labs ordered, completed on 4/4/18 and results are in chart.   Note: If patient appointment is for lab review and patient did not complete labs, check with provider if OK to reschedule patient until labs completed.       •  Imaging - Imaging was not ordered at last office visit.       •  Referrals - No referrals were ordered at last office visit.    3. Is this appointment scheduled as a Hospital Follow-Up? No    4.  Immunizations were updated in [facility] using WebIZ?: Yes       •  Web Iz Recommendations: FLU, PNEUMOVAX (PPSV23), TD, VARICELLA (Chicken Pox)  and SHINGRIX (Shingles)    5.  Patient is due for the following Health Maintenance Topics:   Health Maintenance Due   Topic Date Due   • IMM ZOSTER VACCINES (2 of 3) 04/13/2016   • IMM INFLUENZA (1) 09/01/2018     6. Orders for overdue Health Maintenance topics pended in Pre-Charting? NO    7.  AHA (MDX) form printed for Provider? YES    8.  Patient was NOT informed to arrive 15 min prior to their scheduled appointment and bring in their medication bottles.

## 2019-03-26 ENCOUNTER — OFFICE VISIT (OUTPATIENT)
Dept: MEDICAL GROUP | Facility: MEDICAL CENTER | Age: 64
End: 2019-03-26
Payer: MEDICARE

## 2019-03-26 VITALS
OXYGEN SATURATION: 95 % | SYSTOLIC BLOOD PRESSURE: 140 MMHG | HEIGHT: 76 IN | DIASTOLIC BLOOD PRESSURE: 82 MMHG | TEMPERATURE: 98.1 F | HEART RATE: 89 BPM | WEIGHT: 177 LBS | BODY MASS INDEX: 21.55 KG/M2

## 2019-03-26 DIAGNOSIS — M25.562 CHRONIC PAIN OF LEFT KNEE: ICD-10-CM

## 2019-03-26 DIAGNOSIS — Z00.00 MEDICARE ANNUAL WELLNESS VISIT, SUBSEQUENT: ICD-10-CM

## 2019-03-26 DIAGNOSIS — R94.31 QT PROLONGATION: ICD-10-CM

## 2019-03-26 DIAGNOSIS — Z23 NEED FOR VACCINATION: ICD-10-CM

## 2019-03-26 DIAGNOSIS — E78.00 PURE HYPERCHOLESTEROLEMIA: ICD-10-CM

## 2019-03-26 DIAGNOSIS — N40.0 BENIGN PROSTATIC HYPERPLASIA WITHOUT LOWER URINARY TRACT SYMPTOMS: ICD-10-CM

## 2019-03-26 DIAGNOSIS — D70.8 OTHER NEUTROPENIA (HCC): ICD-10-CM

## 2019-03-26 DIAGNOSIS — R97.20 ELEVATED PSA: ICD-10-CM

## 2019-03-26 DIAGNOSIS — R73.03 PREDIABETES: ICD-10-CM

## 2019-03-26 DIAGNOSIS — F41.9 ANXIETY: ICD-10-CM

## 2019-03-26 DIAGNOSIS — R73.01 ELEVATED FASTING BLOOD SUGAR: ICD-10-CM

## 2019-03-26 DIAGNOSIS — G89.29 CHRONIC PAIN OF LEFT KNEE: ICD-10-CM

## 2019-03-26 DIAGNOSIS — J30.2 SEASONAL ALLERGIES: ICD-10-CM

## 2019-03-26 PROCEDURE — 8041 PR SCP AHA: Performed by: FAMILY MEDICINE

## 2019-03-26 PROCEDURE — G0009 ADMIN PNEUMOCOCCAL VACCINE: HCPCS | Performed by: FAMILY MEDICINE

## 2019-03-26 PROCEDURE — 90732 PPSV23 VACC 2 YRS+ SUBQ/IM: CPT | Performed by: FAMILY MEDICINE

## 2019-03-26 PROCEDURE — G0439 PPPS, SUBSEQ VISIT: HCPCS | Performed by: FAMILY MEDICINE

## 2019-03-26 ASSESSMENT — ENCOUNTER SYMPTOMS: GENERAL WELL-BEING: GOOD

## 2019-03-26 ASSESSMENT — ACTIVITIES OF DAILY LIVING (ADL): BATHING_REQUIRES_ASSISTANCE: 0

## 2019-03-26 ASSESSMENT — PATIENT HEALTH QUESTIONNAIRE - PHQ9: CLINICAL INTERPRETATION OF PHQ2 SCORE: 0

## 2019-04-09 ENCOUNTER — HOSPITAL ENCOUNTER (OUTPATIENT)
Dept: LAB | Facility: MEDICAL CENTER | Age: 64
End: 2019-04-09
Attending: FAMILY MEDICINE
Payer: MEDICARE

## 2019-04-09 DIAGNOSIS — R73.01 ELEVATED FASTING BLOOD SUGAR: ICD-10-CM

## 2019-04-09 DIAGNOSIS — D70.8 OTHER NEUTROPENIA (HCC): ICD-10-CM

## 2019-04-09 DIAGNOSIS — E78.00 PURE HYPERCHOLESTEROLEMIA: ICD-10-CM

## 2019-04-09 LAB
ALBUMIN SERPL BCP-MCNC: 4.6 G/DL (ref 3.2–4.9)
ALBUMIN/GLOB SERPL: 1.9 G/DL
ALP SERPL-CCNC: 35 U/L (ref 30–99)
ALT SERPL-CCNC: 18 U/L (ref 2–50)
ANION GAP SERPL CALC-SCNC: 7 MMOL/L (ref 0–11.9)
AST SERPL-CCNC: 20 U/L (ref 12–45)
BASOPHILS # BLD AUTO: 0.5 % (ref 0–1.8)
BASOPHILS # BLD: 0.02 K/UL (ref 0–0.12)
BILIRUB SERPL-MCNC: 0.7 MG/DL (ref 0.1–1.5)
BUN SERPL-MCNC: 14 MG/DL (ref 8–22)
CALCIUM SERPL-MCNC: 9.1 MG/DL (ref 8.5–10.5)
CHLORIDE SERPL-SCNC: 103 MMOL/L (ref 96–112)
CHOLEST SERPL-MCNC: 191 MG/DL (ref 100–199)
CO2 SERPL-SCNC: 26 MMOL/L (ref 20–33)
CREAT SERPL-MCNC: 0.78 MG/DL (ref 0.5–1.4)
EOSINOPHIL # BLD AUTO: 0.18 K/UL (ref 0–0.51)
EOSINOPHIL NFR BLD: 4.8 % (ref 0–6.9)
ERYTHROCYTE [DISTWIDTH] IN BLOOD BY AUTOMATED COUNT: 48.1 FL (ref 35.9–50)
EST. AVERAGE GLUCOSE BLD GHB EST-MCNC: 114 MG/DL
FASTING STATUS PATIENT QL REPORTED: NORMAL
GLOBULIN SER CALC-MCNC: 2.4 G/DL (ref 1.9–3.5)
GLUCOSE SERPL-MCNC: 98 MG/DL (ref 65–99)
HBA1C MFR BLD: 5.6 % (ref 0–5.6)
HCT VFR BLD AUTO: 45.7 % (ref 42–52)
HDLC SERPL-MCNC: 75 MG/DL
HGB BLD-MCNC: 15.2 G/DL (ref 14–18)
IMM GRANULOCYTES # BLD AUTO: 0.01 K/UL (ref 0–0.11)
IMM GRANULOCYTES NFR BLD AUTO: 0.3 % (ref 0–0.9)
LDLC SERPL CALC-MCNC: 107 MG/DL
LYMPHOCYTES # BLD AUTO: 1.11 K/UL (ref 1–4.8)
LYMPHOCYTES NFR BLD: 29.4 % (ref 22–41)
MCH RBC QN AUTO: 32.2 PG (ref 27–33)
MCHC RBC AUTO-ENTMCNC: 33.3 G/DL (ref 33.7–35.3)
MCV RBC AUTO: 96.8 FL (ref 81.4–97.8)
MONOCYTES # BLD AUTO: 0.55 K/UL (ref 0–0.85)
MONOCYTES NFR BLD AUTO: 14.6 % (ref 0–13.4)
NEUTROPHILS # BLD AUTO: 1.91 K/UL (ref 1.82–7.42)
NEUTROPHILS NFR BLD: 50.4 % (ref 44–72)
NRBC # BLD AUTO: 0 K/UL
NRBC BLD-RTO: 0 /100 WBC
PLATELET # BLD AUTO: 220 K/UL (ref 164–446)
PMV BLD AUTO: 10.2 FL (ref 9–12.9)
POTASSIUM SERPL-SCNC: 4.2 MMOL/L (ref 3.6–5.5)
PROT SERPL-MCNC: 7 G/DL (ref 6–8.2)
RBC # BLD AUTO: 4.72 M/UL (ref 4.7–6.1)
SODIUM SERPL-SCNC: 136 MMOL/L (ref 135–145)
TRIGL SERPL-MCNC: 46 MG/DL (ref 0–149)
TSH SERPL DL<=0.005 MIU/L-ACNC: 1.56 UIU/ML (ref 0.38–5.33)
WBC # BLD AUTO: 3.8 K/UL (ref 4.8–10.8)

## 2019-04-09 PROCEDURE — 83036 HEMOGLOBIN GLYCOSYLATED A1C: CPT

## 2019-04-09 PROCEDURE — 36415 COLL VENOUS BLD VENIPUNCTURE: CPT

## 2019-04-09 PROCEDURE — 80053 COMPREHEN METABOLIC PANEL: CPT

## 2019-04-09 PROCEDURE — 85025 COMPLETE CBC W/AUTO DIFF WBC: CPT

## 2019-04-09 PROCEDURE — 84443 ASSAY THYROID STIM HORMONE: CPT

## 2019-04-09 PROCEDURE — 80061 LIPID PANEL: CPT

## 2019-04-10 ENCOUNTER — TELEPHONE (OUTPATIENT)
Dept: MEDICAL GROUP | Facility: MEDICAL CENTER | Age: 64
End: 2019-04-10

## 2019-04-10 NOTE — TELEPHONE ENCOUNTER
Phone Number Called: [phone] (home)       Message: Patient notified of results.       Left Message for patient to call back: N\A

## 2019-04-10 NOTE — TELEPHONE ENCOUNTER
----- Message from Binu Montemayor M.D. sent at 4/10/2019  7:50 AM PDT -----  Please notify patient that average blood sugar is now normal.  Kidney function, liver function, thyroid function are also normal.  Cholesterol has improved since last year.  Blood counts are stable.  We should recheck labs annually.  Binu Montemayor M.D.

## 2019-04-30 ENCOUNTER — HOSPITAL ENCOUNTER (OUTPATIENT)
Dept: RADIOLOGY | Facility: MEDICAL CENTER | Age: 64
End: 2019-04-30
Attending: UROLOGY
Payer: MEDICARE

## 2019-04-30 DIAGNOSIS — R97.20 ELEVATED PSA: ICD-10-CM

## 2019-04-30 PROCEDURE — 700117 HCHG RX CONTRAST REV CODE 255: Performed by: UROLOGY

## 2019-04-30 PROCEDURE — 72197 MRI PELVIS W/O & W/DYE: CPT

## 2019-04-30 PROCEDURE — A9585 GADOBUTROL INJECTION: HCPCS | Performed by: UROLOGY

## 2019-04-30 PROCEDURE — 700111 HCHG RX REV CODE 636 W/ 250 OVERRIDE (IP): Performed by: RADIOLOGY

## 2019-04-30 RX ORDER — GADOBUTROL 604.72 MG/ML
10 INJECTION INTRAVENOUS ONCE
Status: COMPLETED | OUTPATIENT
Start: 2019-04-30 | End: 2019-04-30

## 2019-04-30 RX ADMIN — GLUCAGON HYDROCHLORIDE 1 MG: KIT at 13:10

## 2019-04-30 RX ADMIN — GADOBUTROL 10 ML: 604.72 INJECTION INTRAVENOUS at 14:13

## 2019-12-10 ENCOUNTER — PATIENT OUTREACH (OUTPATIENT)
Dept: HEALTH INFORMATION MANAGEMENT | Facility: OTHER | Age: 64
End: 2019-12-10

## 2019-12-10 NOTE — PROGRESS NOTES
Outcome: Left Message to call back so I am able to complete my SCP  introduction.    Please transfer to Patient Outreach Team at [phone] patient returns call.    [facility] Verified: yes    Attempt # 1

## 2020-01-07 NOTE — PROGRESS NOTES
Called member to introduce myself as his SCP  and wish him a happy birthday. His wife answered and took a message and informed me she got my letter. If the member calls back, please transfer to [phone].      Attempt #2

## 2020-02-17 DIAGNOSIS — F41.9 ANXIETY: ICD-10-CM

## 2020-02-18 RX ORDER — ALPRAZOLAM 0.5 MG/1
TABLET ORAL
Qty: 180 TAB | Refills: 0 | Status: SHIPPED
Start: 2020-02-18 | End: 2020-05-14 | Stop reason: SDUPTHER

## 2020-02-19 ENCOUNTER — TELEPHONE (OUTPATIENT)
Dept: MEDICAL GROUP | Facility: MEDICAL CENTER | Age: 65
End: 2020-02-19

## 2020-02-19 DIAGNOSIS — F41.9 ANXIETY: ICD-10-CM

## 2020-02-19 RX ORDER — ALPRAZOLAM 0.5 MG/1
0.5 TABLET ORAL 2 TIMES DAILY PRN
Qty: 180 TAB | Refills: 0 | Status: CANCELLED | OUTPATIENT
Start: 2020-02-19 | End: 2020-05-19

## 2020-03-30 NOTE — PROGRESS NOTES
Called member to speak with him about the SCP  program. His wife answered and said she got my letter and takes care of him fine on her own and they are not interested. She asked that he be put on the DNC list. I advised that I would.     Attempt # 3

## 2020-05-14 ENCOUNTER — OFFICE VISIT (OUTPATIENT)
Dept: MEDICAL GROUP | Facility: MEDICAL CENTER | Age: 65
End: 2020-05-14
Payer: MEDICARE

## 2020-05-14 VITALS
HEART RATE: 85 BPM | SYSTOLIC BLOOD PRESSURE: 122 MMHG | DIASTOLIC BLOOD PRESSURE: 68 MMHG | TEMPERATURE: 98.1 F | WEIGHT: 171 LBS | HEIGHT: 76 IN | OXYGEN SATURATION: 98 % | BODY MASS INDEX: 20.82 KG/M2

## 2020-05-14 DIAGNOSIS — Z23 NEED FOR VACCINATION: ICD-10-CM

## 2020-05-14 DIAGNOSIS — Z12.11 COLON CANCER SCREENING: ICD-10-CM

## 2020-05-14 DIAGNOSIS — F41.9 ANXIETY: ICD-10-CM

## 2020-05-14 DIAGNOSIS — H91.90 HEARING LOSS, UNSPECIFIED HEARING LOSS TYPE, UNSPECIFIED LATERALITY: ICD-10-CM

## 2020-05-14 DIAGNOSIS — R73.03 PREDIABETES: ICD-10-CM

## 2020-05-14 DIAGNOSIS — R97.20 ELEVATED PSA: ICD-10-CM

## 2020-05-14 DIAGNOSIS — Z00.00 MEDICARE ANNUAL WELLNESS VISIT, SUBSEQUENT: ICD-10-CM

## 2020-05-14 DIAGNOSIS — R73.01 ELEVATED FASTING BLOOD SUGAR: ICD-10-CM

## 2020-05-14 DIAGNOSIS — E78.00 PURE HYPERCHOLESTEROLEMIA: ICD-10-CM

## 2020-05-14 DIAGNOSIS — Z11.59 NEED FOR HEPATITIS C SCREENING TEST: ICD-10-CM

## 2020-05-14 PROCEDURE — 90471 IMMUNIZATION ADMIN: CPT | Performed by: FAMILY MEDICINE

## 2020-05-14 PROCEDURE — 90750 HZV VACC RECOMBINANT IM: CPT | Performed by: FAMILY MEDICINE

## 2020-05-14 PROCEDURE — 8041 PR SCP AHA: Performed by: FAMILY MEDICINE

## 2020-05-14 PROCEDURE — G0439 PPPS, SUBSEQ VISIT: HCPCS | Performed by: FAMILY MEDICINE

## 2020-05-14 RX ORDER — ALPRAZOLAM 0.5 MG/1
0.5 TABLET ORAL 2 TIMES DAILY PRN
Qty: 180 TAB | Refills: 1 | Status: SHIPPED | OUTPATIENT
Start: 2020-05-14 | End: 2020-08-10

## 2020-05-14 ASSESSMENT — FIBROSIS 4 INDEX: FIB4 SCORE: 1.44

## 2020-05-14 ASSESSMENT — ENCOUNTER SYMPTOMS: GENERAL WELL-BEING: GOOD

## 2020-05-14 ASSESSMENT — PATIENT HEALTH QUESTIONNAIRE - PHQ9: CLINICAL INTERPRETATION OF PHQ2 SCORE: 0

## 2020-05-14 ASSESSMENT — ACTIVITIES OF DAILY LIVING (ADL): BATHING_REQUIRES_ASSISTANCE: 0

## 2020-05-14 NOTE — PROGRESS NOTES
HPI:  Efraín Ayala is a 65 y.o. here for Medicare Annual Wellness Visit     Patient Active Problem List    Diagnosis Date Noted   • Elevated PSA 04/05/2018   • Pure hypercholesterolemia 03/26/2018   • Prediabetes 03/26/2018   • Benign prostatic hyperplasia without lower urinary tract symptoms 03/26/2018   • Chronic pain of left knee 08/22/2017   • QT prolongation 04/16/2015   • Seasonal allergies 03/11/2015   • Anxiety 03/11/2015   • Elevated fasting blood sugar 03/11/2015       Current Outpatient Medications   Medication Sig Dispense Refill   • ALPRAZolam (XANAX) 0.5 MG Tab TAKE 1 TABLET BY MOUTH TWICE DAILY AS NEEDED FOR ANXIETY 180 Tab 0   • ibuprofen (MOTRIN) 200 MG Tab Take 400 mg by mouth every 6 hours as needed.       No current facility-administered medications for this visit.             Current supplements as per medication list.       Allergies: Other misc and Shellfish allergy    Current social contact/activities: living with wife, 1 child lives in [place], 1 child lives in [place], 1 child lives in [place].     He  reports that he has quit smoking. His smoking use included cigarettes. He has a 7.50 pack-year smoking history. He has never used smokeless tobacco. He reports current alcohol use of about 8.4 oz of alcohol per week. He reports that he does not use drugs.  Counseling given: Not Answered  Comment: Quit 1986      DPA/Advanced Directive:  Patient has Advanced Directive on file.     ROS:    Gait: Uses no assistive device  Ostomy: No  Other tubes: No  Amputations: No  Chronic oxygen use: No  Last eye exam: about 2 years.  Wears hearing aids: No   : Denies any urinary leakage during the last 6 months      Depression Screening    Little interest or pleasure in doing things?  0 - not at all  Feeling down, depressed , or hopeless? 0 - not at all  Patient Health Questionnaire Score: 0     If depressive symptoms identified deferred to follow up visit unless specifically addressed  in assessment and plan.    Interpretation of PHQ-9 Total Score   Score Severity   1-4 No Depression   5-9 Mild Depression   10-14 Moderate Depression   15-19 Moderately Severe Depression   20-27 Severe Depression    Screening for Cognitive Impairment    Three Minute Recall (village, kitchen, baby) 3/3    Ashutosh clock face with all 12 numbers and set the hands to show 10 past 10.  Yes    Cognitive concerns identified deferred for follow up unless specifically addressed in assessment and plan.    Fall Risk Assessment    Has the patient had two or more falls in the last year or any fall with injury in the last year?  No    Safety Assessment    Throw rugs on floor.  No  Handrails on all stairs.  No  Good lighting in all hallways.  Yes  Difficulty hearing.  No  Patient counseled about all safety risks that were identified.    Functional Assessment ADLs    Are there any barriers preventing you from cooking for yourself or meeting nutritional needs?  No.    Are there any barriers preventing you from driving safely or obtaining transportation?  No.    Are there any barriers preventing you from using a telephone or calling for help?  No.    Are there any barriers preventing you from shopping?  No.    Are there any barriers preventing you from taking care of your own finances?  No.    Are there any barriers preventing you from managing your medications?  No.    Are there any barriers preventing you from showering, bathing or dressing yourself?  No.    Are you currently engaging in any exercise or physical activity?  Yes.     What is your perception of your health?  Good.      Health Maintenance Summary                HEPATITIS C SCREENING Overdue 1955     IMM ZOSTER VACCINES Overdue 4/13/2016      Done 2/17/2016 Imm Admin: Zoster Vaccine Live (ZVL) (Zostavax)    Annual Wellness Visit Overdue 3/26/2020      Done 3/26/2019 Visit Dx: Medicare annual wellness visit, subsequent     Patient has more history with this topic...     "COLONOSCOPY Overdue 5/14/2020      Done 5/14/2015 AMB REFERRAL TO GI FOR COLONOSCOPY    IMM INFLUENZA Next Due 9/1/2020      Done 9/15/2017 Imm Admin: Influenza Vaccine Quad Inj (Pf)     Patient has more history with this topic...    IMM DTaP/Tdap/Td Vaccine Next Due 3/14/2027      Done 3/14/2017 Imm Admin: Tdap Vaccine          Patient Care Team:  Binu Montemayor M.D. as PCP - General (Family Medicine)        Social History     Tobacco Use   • Smoking status: Former Smoker     Packs/day: 0.50     Years: 15.00     Pack years: 7.50     Types: Cigarettes   • Smokeless tobacco: Never Used   • Tobacco comment: Quit 1986   Substance Use Topics   • Alcohol use: Yes     Alcohol/week: 8.4 oz     Types: 14 Cans of beer per week     Comment: 2 beers daily    • Drug use: No     Comment: cannabis     History reviewed. No pertinent family history.  He  has a past medical history of Anxiety (3/11/2015), Arthritis (2017), Psychiatric problem (2017), Seasonal allergies (3/11/2015), and Snoring.   Past Surgical History:   Procedure Laterality Date   • INGUINAL HERNIA REPAIR Right 4/13/2017    Procedure: INGUINAL HERNIA REPAIR;  Surgeon: Kwadwo Brewer M.D.;  Location: SURGERY [facility];  Service:    • TIBIA ORIF  1992    Left       Exam:   /68   Pulse 85   Temp 36.7 °C (98.1 °F) (Temporal)   Ht 1.93 m (6' 4\")   Wt 77.6 kg (171 lb)   SpO2 98%  Body mass index is 20.81 kg/m².    Constitutional: Alert, no distress.  Skin: Warm, dry, good turgor, no rashes in visible areas.  Eye: Equal, round and reactive, conjunctiva clear, lids normal.  Ears: TM pearly gray bilaterally.  Respiratory: Unlabored respiratory effort, lungs clear to auscultation, no wheezes, no ronchi.  Cardiovascular: Normal S1, S2, no murmur, no edema.  Psych: Alert and oriented x3, normal affect and mood.      Assessment and Plan. The following treatment and monitoring plan is recommended:    1. Medicare annual wellness visit, subsequent  - Subsequent " Annual Wellness Visit - Includes PPPS ()    2. Elevated PSA  Negative MRI and prostate biopsy within the last 2 years.  Follow-up PSA and monitor trend.  He is known asymptomatic BPH, which could explain the slight elevation in PSA.  - PROSTATE SPECIFIC AG DIAGNOSTIC; Future  - Subsequent Annual Wellness Visit - Includes PPPS ()    3. Elevated fasting blood sugar  Check labs and call with results.  - HEMOGLOBIN A1C; Future  - Subsequent Annual Wellness Visit - Includes PPPS ()    4. Prediabetes  Check labs and call with results.  - Subsequent Annual Wellness Visit - Includes PPPS ()    5. Pure hypercholesterolemia  Check labs and call with results.  - Comp Metabolic Panel; Future  - Lipid Profile; Future  - Subsequent Annual Wellness Visit - Includes PPPS ()    6. Anxiety  Controlled.  Continue alprazolam 0.5 mg twice daily.  - Subsequent Annual Wellness Visit - Includes PPPS ()  - ALPRAZolam (XANAX) 0.5 MG Tab; Take 1 Tab by mouth 2 times a day as needed for up to 90 days. FOR ANXIETY  Dispense: 180 Tab; Refill: 1    7. Hearing loss, unspecified hearing loss type, unspecified laterality  - REFERRAL TO AUDIOLOGY  - Subsequent Annual Wellness Visit - Includes PPPS ()    8. Need for hepatitis C screening test  - HCV Scrn ( [id number] 1xLife); Future  - Subsequent Annual Wellness Visit - Includes PPPS ()    9. Need for vaccination  - Shingles Vaccine (SHINGRIX)  - Subsequent Annual Wellness Visit - Includes PPPS ()    10. Colon cancer screening  - REFERRAL TO GASTROENTEROLOGY  - Subsequent Annual Wellness Visit - Includes PPPS ()            Services suggested: No services needed at this time  Health Care Screening: Age-appropriate preventive services recommended by USPTF and ACIP covered by Medicare were discussed today. Services ordered if indicated and agreed upon by the patient.  Referrals offered: Community-based lifestyle interventions to reduce health risks and  promote self-management and wellness, fall prevention, nutrition, physical activity, tobacco-use cessation, weight loss, and mental health services as per orders if indicated.    Discussion today about general wellness and lifestyle habits:    · Prevent falls and reduce trip hazards; Cautioned about securing or removing rugs.  · Have a working fire alarm and carbon monoxide detector;   · Engage in regular physical activity and social activities     Follow-up: Return in about 1 year (around 5/14/2021) for Annual.

## 2020-06-17 ENCOUNTER — HOSPITAL ENCOUNTER (OUTPATIENT)
Dept: LAB | Facility: MEDICAL CENTER | Age: 65
End: 2020-06-17
Attending: FAMILY MEDICINE
Payer: MEDICARE

## 2020-06-17 DIAGNOSIS — E78.00 PURE HYPERCHOLESTEROLEMIA: ICD-10-CM

## 2020-06-17 DIAGNOSIS — R73.01 ELEVATED FASTING BLOOD SUGAR: ICD-10-CM

## 2020-06-17 DIAGNOSIS — R97.20 ELEVATED PSA: ICD-10-CM

## 2020-06-17 DIAGNOSIS — Z11.59 NEED FOR HEPATITIS C SCREENING TEST: ICD-10-CM

## 2020-06-17 LAB
ALBUMIN SERPL BCP-MCNC: 4.7 G/DL (ref 3.2–4.9)
ALBUMIN/GLOB SERPL: 1.8 G/DL
ALP SERPL-CCNC: 50 U/L (ref 30–99)
ALT SERPL-CCNC: 20 U/L (ref 2–50)
ANION GAP SERPL CALC-SCNC: 11 MMOL/L (ref 7–16)
AST SERPL-CCNC: 23 U/L (ref 12–45)
BILIRUB SERPL-MCNC: 0.5 MG/DL (ref 0.1–1.5)
BUN SERPL-MCNC: 12 MG/DL (ref 8–22)
CALCIUM SERPL-MCNC: 9.9 MG/DL (ref 8.5–10.5)
CHLORIDE SERPL-SCNC: 103 MMOL/L (ref 96–112)
CHOLEST SERPL-MCNC: 189 MG/DL (ref 100–199)
CO2 SERPL-SCNC: 26 MMOL/L (ref 20–33)
CREAT SERPL-MCNC: 0.74 MG/DL (ref 0.5–1.4)
FASTING STATUS PATIENT QL REPORTED: NORMAL
GLOBULIN SER CALC-MCNC: 2.6 G/DL (ref 1.9–3.5)
GLUCOSE SERPL-MCNC: 115 MG/DL (ref 65–99)
HCV AB SER QL: NORMAL
HDLC SERPL-MCNC: 84 MG/DL
LDLC SERPL CALC-MCNC: 97 MG/DL
POTASSIUM SERPL-SCNC: 5.1 MMOL/L (ref 3.6–5.5)
PROT SERPL-MCNC: 7.3 G/DL (ref 6–8.2)
PSA SERPL-MCNC: 5.12 NG/ML (ref 0–4)
SODIUM SERPL-SCNC: 140 MMOL/L (ref 135–145)
TRIGL SERPL-MCNC: 40 MG/DL (ref 0–149)

## 2020-06-17 PROCEDURE — 80061 LIPID PANEL: CPT

## 2020-06-17 PROCEDURE — 36415 COLL VENOUS BLD VENIPUNCTURE: CPT

## 2020-06-17 PROCEDURE — G0472 HEP C SCREEN HIGH RISK/OTHER: HCPCS

## 2020-06-17 PROCEDURE — 83036 HEMOGLOBIN GLYCOSYLATED A1C: CPT

## 2020-06-17 PROCEDURE — 84153 ASSAY OF PSA TOTAL: CPT

## 2020-06-17 PROCEDURE — 80053 COMPREHEN METABOLIC PANEL: CPT

## 2020-06-18 ENCOUNTER — TELEPHONE (OUTPATIENT)
Dept: MEDICAL GROUP | Facility: MEDICAL CENTER | Age: 65
End: 2020-06-18

## 2020-06-18 LAB
EST. AVERAGE GLUCOSE BLD GHB EST-MCNC: 105 MG/DL
HBA1C MFR BLD: 5.3 % (ref 0–5.6)

## 2020-06-18 NOTE — TELEPHONE ENCOUNTER
Left a message for patient to call back at [phone].     Yue Zheng  [facility] Assistant

## 2020-06-18 NOTE — TELEPHONE ENCOUNTER
----- Message from Binu Montemayor M.D. sent at 6/18/2020  7:24 AM PDT -----  Please notify patient that cholesterol, kidney function, liver function, hepatitis C screening are normal.  His PSA is slightly higher compared to last year, this year his PSA is 5.1, last year was 4.7, this is not a large increase over 1 year.  He did have a negative biopsy and MRI, so no concerns right now.  We should continue to check his PSA annually.  His blood sugar is elevated, possibly in the prediabetic range. We advise to reduce sugar/carbohydrate/alcohol, eat more vegetables and lean meats such as fish/chicken/turkey. We also recommend 30 minutes of cardiovascular exercise most days of the week.    Binu Montemayor M.D.

## 2020-06-23 NOTE — TELEPHONE ENCOUNTER
Left a message for patient to call back at [phone].     Yue Zheng  [facility] Assistant

## 2020-08-10 DIAGNOSIS — F41.9 ANXIETY: ICD-10-CM

## 2020-08-10 RX ORDER — ALPRAZOLAM 0.5 MG/1
0.5 TABLET ORAL 2 TIMES DAILY PRN
Qty: 180 TAB | Refills: 1 | Status: SHIPPED | OUTPATIENT
Start: 2020-08-10 | End: 2020-11-08

## 2020-09-16 ENCOUNTER — IMMUNIZATION (OUTPATIENT)
Dept: SOCIAL WORK | Facility: CLINIC | Age: 65
End: 2020-09-16
Payer: MEDICARE

## 2020-09-16 DIAGNOSIS — Z23 NEED FOR VACCINATION: ICD-10-CM

## 2020-09-16 PROCEDURE — 90662 IIV NO PRSV INCREASED AG IM: CPT | Performed by: REGISTERED NURSE

## 2020-09-16 PROCEDURE — G0008 ADMIN INFLUENZA VIRUS VAC: HCPCS | Performed by: REGISTERED NURSE

## 2021-03-03 DIAGNOSIS — Z23 NEED FOR VACCINATION: ICD-10-CM

## (undated) DEVICE — LACTATED RINGERS INJ 1000 ML - (14EA/CA 60CA/PF)

## (undated) DEVICE — LEAD SET 6 DISP. EKG NIHON KOHDEN

## (undated) DEVICE — DRESSING TRANSPARENT FILM TEGADERM 4 X 4.75" (50EA/BX)"

## (undated) DEVICE — SUTURE 3-0 VICRYL PLUS SH - 8X 18 INCH (12/BX)

## (undated) DEVICE — DRAIN PENROSE STERILE 1/4 X - 18 IN  (25EA/BX)

## (undated) DEVICE — PACK MINOR BASIN - (2EA/CA)

## (undated) DEVICE — GOWN WARMING STANDARD FLEX - (30/CA)

## (undated) DEVICE — KIT ROOM DECONTAMINATION

## (undated) DEVICE — SENSOR SPO2 NEO LNCS ADHESIVE (20/BX) SEE USER NOTES

## (undated) DEVICE — MASK, LARYNGEAL AIRWAY #4

## (undated) DEVICE — STERI STRIP COMPOUND BENZOIN - TINCTURE 0.6ML WITH APPLICATOR (40EA/BX)

## (undated) DEVICE — MASK ANESTHESIA ADULT  - (100/CA)

## (undated) DEVICE — SUTURE 4-0 VICRYL PLUS FS-2 - 27 INCH (36/BX)

## (undated) DEVICE — BLADE SURGICAL #15 - (50/BX 3BX/CA)

## (undated) DEVICE — SUTURE 0 ETHIBOND CT-2 C/R 8 X 18 (12PK/BX)"

## (undated) DEVICE — SUTURE 0 PROLENE M06 C/R (12EA/BX)

## (undated) DEVICE — TUBING CLEARLINK DUO-VENT - C-FLO (48EA/CA)

## (undated) DEVICE — HEAD HOLDER JUNIOR/ADULT

## (undated) DEVICE — ELECTRODE 850 FOAM ADHESIVE - HYDROGEL RADIOTRNSPRNT (50/PK)

## (undated) DEVICE — DRAPE LAPAROTOMY T SHEET - (12EA/CA)

## (undated) DEVICE — SPONGE GAUZESTER 4 X 4 4PLY - (128PK/CA)

## (undated) DEVICE — SUTURE GENERAL

## (undated) DEVICE — CANISTER SUCTION 3000ML MECHANICAL FILTER AUTO SHUTOFF MEDI-VAC NONSTERILE LF DISP  (40EA/CA)

## (undated) DEVICE — SET EXTENSION WITH 2 PORTS (48EA/CA) ***PART #2C8610 IS A SUBSTITUTE*****

## (undated) DEVICE — GLOVE BIOGEL INDICATOR SZ 7.5 SURGICAL PF LTX - (50PR/BX 4BX/CA)

## (undated) DEVICE — SUTURE 3-0 VICRYL PLUS SH - 27 INCH (36/BX)

## (undated) DEVICE — PROTECTOR ULNA NERVE - (36PR/CA)

## (undated) DEVICE — GLOVE BIOGEL SZ 7 SURGICAL PF LTX - (50PR/BX 4BX/CA)

## (undated) DEVICE — SYRINGE 10 ML CONTROL LL (25EA/BX 4BX/CA)

## (undated) DEVICE — SUCTION INSTRUMENT YANKAUER BULBOUS TIP W/O VENT (50EA/CA)

## (undated) DEVICE — KIT ANESTHESIA W/CIRCUIT & 3/LT BAG W/FILTER (20EA/CA)

## (undated) DEVICE — SET LEADWIRE 5 LEAD BEDSIDE DISPOSABLE ECG (1SET OF 5/EA)

## (undated) DEVICE — NEPTUNE 4 PORT MANIFOLD - (20/PK)

## (undated) DEVICE — ELECTRODE DUAL RETURN W/ CORD - (50/PK)

## (undated) DEVICE — SODIUM CHL IRRIGATION 0.9% 1000ML (12EA/CA)

## (undated) DEVICE — DETERGENT RENUZYME PLUS 10 OZ PACKET (50/BX)

## (undated) DEVICE — GLOVE BIOGEL PI INDICATOR SZ 7.0 SURGICAL PF LF - (50/BX 4BX/CA)

## (undated) DEVICE — CHLORAPREP 26 ML APPLICATOR - ORANGE TINT(25/CA)

## (undated) DEVICE — CLOSURE WOUND 1/4 X 4 (STERI - STRIP) (50/BX 4BX/CA)